# Patient Record
Sex: FEMALE | Race: BLACK OR AFRICAN AMERICAN | NOT HISPANIC OR LATINO | Employment: FULL TIME | URBAN - METROPOLITAN AREA
[De-identification: names, ages, dates, MRNs, and addresses within clinical notes are randomized per-mention and may not be internally consistent; named-entity substitution may affect disease eponyms.]

---

## 2023-02-24 LAB — URINE CULTURE: NORMAL

## 2023-03-03 LAB
CHLAMYDIA TRACH., AMPLIFIED: NEGATIVE
CLUE CELLS: ABNORMAL
N. GONORRHEA, AMPLIFIED: NEGATIVE
NUGENT SCORE: 1
TRICHOMONAS VAGINALIS: NEGATIVE
VAGINITIS-BV + YEAST INTERPRETATION: ABNORMAL
YEAST: PRESENT

## 2023-03-05 ENCOUNTER — HOSPITAL ENCOUNTER (OUTPATIENT)
Dept: DATA CONVERSION | Facility: HOSPITAL | Age: 23
End: 2023-03-05
Attending: OBSTETRICS & GYNECOLOGY
Payer: COMMERCIAL

## 2023-03-05 DIAGNOSIS — O36.8390: ICD-10-CM

## 2023-03-05 DIAGNOSIS — O26.892 OTHER SPECIFIED PREGNANCY RELATED CONDITIONS, SECOND TRIMESTER (HHS-HCC): ICD-10-CM

## 2023-03-05 DIAGNOSIS — R10.32 LEFT LOWER QUADRANT PAIN: ICD-10-CM

## 2023-03-05 DIAGNOSIS — Z3A.24 24 WEEKS GESTATION OF PREGNANCY (HHS-HCC): ICD-10-CM

## 2023-03-05 DIAGNOSIS — Z34.80 ENCOUNTER FOR SUPERVISION OF OTHER NORMAL PREGNANCY, UNSPECIFIED TRIMESTER (HHS-HCC): ICD-10-CM

## 2023-04-04 LAB
ERYTHROCYTE DISTRIBUTION WIDTH (RATIO) BY AUTOMATED COUNT: 12.6 % (ref 11.5–14.5)
ERYTHROCYTE MEAN CORPUSCULAR HEMOGLOBIN CONCENTRATION (G/DL) BY AUTOMATED: 33 G/DL (ref 32–36)
ERYTHROCYTE MEAN CORPUSCULAR VOLUME (FL) BY AUTOMATED COUNT: 91 FL (ref 80–100)
ERYTHROCYTES (10*6/UL) IN BLOOD BY AUTOMATED COUNT: 3.34 X10E12/L (ref 4–5.2)
FERRITIN, PREGNANCY: 22 UG/L
GLUCOSE, 1 HR SCREEN, PREG: 130 MG/DL
HEMATOCRIT (%) IN BLOOD BY AUTOMATED COUNT: 30.3 % (ref 36–46)
HEMOGLOBIN (G/DL) IN BLOOD: 10 G/DL (ref 12–16)
IRON (UG/DL) IN SER/PLAS IN PREGNANCY: 74 UG/DL
IRON BINDING CAPACITY (UG/DL) IN PREGNANCY: >524 UG/DL
IRON SATURATION (%) IN PREGNANCY: ABNORMAL %
LEUKOCYTES (10*3/UL) IN BLOOD BY AUTOMATED COUNT: 9.2 X10E9/L (ref 4.4–11.3)
NRBC (PER 100 WBCS) BY AUTOMATED COUNT: 0 /100 WBC (ref 0–0)
PLATELETS (10*3/UL) IN BLOOD AUTOMATED COUNT: 250 X10E9/L (ref 150–450)
REFLEX ADDED, ANEMIA PANEL: ABNORMAL
SYPHILIS TOTAL AB: NONREACTIVE

## 2023-04-05 LAB
FOLATE, SERUM, PREGNANCY: 20.1 NG/ML
VITAMIN B12, PREGNANCY: 244 PG/ML

## 2023-04-13 ENCOUNTER — HOSPITAL ENCOUNTER (OUTPATIENT)
Dept: DATA CONVERSION | Facility: HOSPITAL | Age: 23
End: 2023-04-13
Attending: OBSTETRICS & GYNECOLOGY
Payer: COMMERCIAL

## 2023-04-13 DIAGNOSIS — Z3A.30 30 WEEKS GESTATION OF PREGNANCY (HHS-HCC): ICD-10-CM

## 2023-04-13 DIAGNOSIS — O99.343 OTHER MENTAL DISORDERS COMPLICATING PREGNANCY, THIRD TRIMESTER (HHS-HCC): ICD-10-CM

## 2023-04-13 DIAGNOSIS — Z79.899 OTHER LONG TERM (CURRENT) DRUG THERAPY: ICD-10-CM

## 2023-04-13 DIAGNOSIS — F41.0 PANIC DISORDER (EPISODIC PAROXYSMAL ANXIETY): ICD-10-CM

## 2023-04-13 DIAGNOSIS — O99.613 DISEASES OF THE DIGESTIVE SYSTEM COMPLICATING PREGNANCY, THIRD TRIMESTER (HHS-HCC): ICD-10-CM

## 2023-04-13 DIAGNOSIS — O26.893 OTHER SPECIFIED PREGNANCY RELATED CONDITIONS, THIRD TRIMESTER (HHS-HCC): ICD-10-CM

## 2023-04-13 DIAGNOSIS — R42 DIZZINESS AND GIDDINESS: ICD-10-CM

## 2023-04-13 DIAGNOSIS — R55 SYNCOPE AND COLLAPSE: ICD-10-CM

## 2023-04-13 DIAGNOSIS — D57.3 SICKLE-CELL TRAIT (CMS-HCC): ICD-10-CM

## 2023-04-13 DIAGNOSIS — K59.00 CONSTIPATION, UNSPECIFIED: ICD-10-CM

## 2023-04-13 DIAGNOSIS — R10.2 PELVIC AND PERINEAL PAIN: ICD-10-CM

## 2023-04-13 DIAGNOSIS — O99.013 ANEMIA COMPLICATING PREGNANCY, THIRD TRIMESTER (HHS-HCC): ICD-10-CM

## 2023-04-13 LAB
ALANINE AMINOTRANSFERASE (SGPT) (U/L) IN SER/PLAS: 6 U/L (ref 7–45)
ALBUMIN (G/DL) IN SER/PLAS: 3.6 G/DL (ref 3.4–5)
ALKALINE PHOSPHATASE (U/L) IN SER/PLAS: 108 U/L (ref 33–110)
ANION GAP IN SER/PLAS: 13 MMOL/L (ref 10–20)
ASPARTATE AMINOTRANSFERASE (SGOT) (U/L) IN SER/PLAS: 11 U/L (ref 9–39)
BILIRUBIN TOTAL (MG/DL) IN SER/PLAS: 0.3 MG/DL (ref 0–1.2)
CALCIUM (MG/DL) IN SER/PLAS: 8.7 MG/DL (ref 8.6–10.6)
CARBON DIOXIDE, TOTAL (MMOL/L) IN SER/PLAS: 22 MMOL/L (ref 21–32)
CHLORIDE (MMOL/L) IN SER/PLAS: 106 MMOL/L (ref 98–107)
CREATININE (MG/DL) IN SER/PLAS: 0.44 MG/DL (ref 0.5–1.05)
ERYTHROCYTE DISTRIBUTION WIDTH (RATIO) BY AUTOMATED COUNT: 12.6 % (ref 11.5–14.5)
ERYTHROCYTE MEAN CORPUSCULAR HEMOGLOBIN CONCENTRATION (G/DL) BY AUTOMATED: 34.6 G/DL (ref 32–36)
ERYTHROCYTE MEAN CORPUSCULAR VOLUME (FL) BY AUTOMATED COUNT: 89 FL (ref 80–100)
ERYTHROCYTES (10*6/UL) IN BLOOD BY AUTOMATED COUNT: 3.14 X10E12/L (ref 4–5.2)
GFR FEMALE: >90 ML/MIN/1.73M2
GLUCOSE (MG/DL) IN SER/PLAS: 96 MG/DL (ref 74–99)
HEMATOCRIT (%) IN BLOOD BY AUTOMATED COUNT: 28 % (ref 36–46)
HEMOGLOBIN (G/DL) IN BLOOD: 9.7 G/DL (ref 12–16)
LEUKOCYTES (10*3/UL) IN BLOOD BY AUTOMATED COUNT: 11.3 X10E9/L (ref 4.4–11.3)
NRBC (PER 100 WBCS) BY AUTOMATED COUNT: 0 /100 WBC (ref 0–0)
PLATELETS (10*3/UL) IN BLOOD AUTOMATED COUNT: 226 X10E9/L (ref 150–450)
POTASSIUM (MMOL/L) IN SER/PLAS: 3.7 MMOL/L (ref 3.5–5.3)
PROTEIN TOTAL: 6.3 G/DL (ref 6.4–8.2)
SODIUM (MMOL/L) IN SER/PLAS: 137 MMOL/L (ref 136–145)
UREA NITROGEN (MG/DL) IN SER/PLAS: 5 MG/DL (ref 6–23)

## 2023-05-11 ENCOUNTER — APPOINTMENT (OUTPATIENT)
Dept: LAB | Facility: LAB | Age: 23
End: 2023-05-11
Payer: COMMERCIAL

## 2023-05-12 LAB — URINE CULTURE: NORMAL

## 2023-05-15 ENCOUNTER — HOSPITAL ENCOUNTER (OUTPATIENT)
Dept: DATA CONVERSION | Facility: HOSPITAL | Age: 23
End: 2023-05-16
Attending: ADVANCED PRACTICE MIDWIFE
Payer: COMMERCIAL

## 2023-05-15 DIAGNOSIS — R10.9 UNSPECIFIED ABDOMINAL PAIN: ICD-10-CM

## 2023-05-15 DIAGNOSIS — D57.3 SICKLE-CELL TRAIT (CMS-HCC): ICD-10-CM

## 2023-05-15 DIAGNOSIS — Z3A.34 34 WEEKS GESTATION OF PREGNANCY (HHS-HCC): ICD-10-CM

## 2023-05-15 DIAGNOSIS — O26.893 OTHER SPECIFIED PREGNANCY RELATED CONDITIONS, THIRD TRIMESTER (HHS-HCC): ICD-10-CM

## 2023-05-15 DIAGNOSIS — D64.9 ANEMIA, UNSPECIFIED: ICD-10-CM

## 2023-05-15 DIAGNOSIS — O34.219 MATERNAL CARE FOR UNSPECIFIED TYPE SCAR FROM PREVIOUS CESAREAN DELIVERY (HHS-HCC): ICD-10-CM

## 2023-05-15 DIAGNOSIS — Z79.899 OTHER LONG TERM (CURRENT) DRUG THERAPY: ICD-10-CM

## 2023-05-15 DIAGNOSIS — O99.013 ANEMIA COMPLICATING PREGNANCY, THIRD TRIMESTER (HHS-HCC): ICD-10-CM

## 2023-05-18 LAB — URINE CULTURE: ABNORMAL

## 2023-05-24 ENCOUNTER — HOSPITAL ENCOUNTER (OUTPATIENT)
Dept: DATA CONVERSION | Facility: HOSPITAL | Age: 23
End: 2023-05-24
Attending: OBSTETRICS & GYNECOLOGY
Payer: COMMERCIAL

## 2023-05-24 DIAGNOSIS — O47.1 FALSE LABOR AT OR AFTER 37 COMPLETED WEEKS OF GESTATION (HHS-HCC): ICD-10-CM

## 2023-05-24 DIAGNOSIS — Z3A.36 36 WEEKS GESTATION OF PREGNANCY (HHS-HCC): ICD-10-CM

## 2023-05-31 LAB — URINE CULTURE: ABNORMAL

## 2023-06-06 ENCOUNTER — APPOINTMENT (OUTPATIENT)
Dept: LAB | Facility: LAB | Age: 23
End: 2023-06-06
Payer: COMMERCIAL

## 2023-06-06 LAB
ERYTHROCYTE DISTRIBUTION WIDTH (RATIO) BY AUTOMATED COUNT: 12.8 % (ref 11.5–14.5)
ERYTHROCYTE MEAN CORPUSCULAR HEMOGLOBIN CONCENTRATION (G/DL) BY AUTOMATED: 33.6 G/DL (ref 32–36)
ERYTHROCYTE MEAN CORPUSCULAR VOLUME (FL) BY AUTOMATED COUNT: 90 FL (ref 80–100)
ERYTHROCYTES (10*6/UL) IN BLOOD BY AUTOMATED COUNT: 3.28 X10E12/L (ref 4–5.2)
FERRITIN (UG/LL) IN SER/PLAS: 18 UG/L (ref 8–150)
HEMATOCRIT (%) IN BLOOD BY AUTOMATED COUNT: 29.5 % (ref 36–46)
HEMOGLOBIN (G/DL) IN BLOOD: 9.9 G/DL (ref 12–16)
HEMOGLOBIN (PG) IN RETICULOCYTES: 34 PG (ref 28–38)
IMMATURE RETIC FRACTION: 25.9 % (ref 0–16)
LEUKOCYTES (10*3/UL) IN BLOOD BY AUTOMATED COUNT: 11.6 X10E9/L (ref 4.4–11.3)
NRBC (PER 100 WBCS) BY AUTOMATED COUNT: 0 /100 WBC (ref 0–0)
PLATELETS (10*3/UL) IN BLOOD AUTOMATED COUNT: 203 X10E9/L (ref 150–450)
RETICULOCYTES (10*3/UL) IN BLOOD: 0.07 X10E12/L (ref 0.02–0.08)
RETICULOCYTES/100 ERYTHROCYTES IN BLOOD BY AUTOMATED COUNT: 2.2 % (ref 0.5–2)

## 2023-06-07 LAB — URINE CULTURE: NORMAL

## 2023-06-13 ENCOUNTER — APPOINTMENT (OUTPATIENT)
Dept: LAB | Facility: LAB | Age: 23
End: 2023-06-13
Payer: COMMERCIAL

## 2023-06-13 LAB
ABO GROUP (TYPE) IN BLOOD: NORMAL
ANTIBODY SCREEN: NORMAL
ERYTHROCYTE DISTRIBUTION WIDTH (RATIO) BY AUTOMATED COUNT: 13.1 % (ref 11.5–14.5)
ERYTHROCYTE MEAN CORPUSCULAR HEMOGLOBIN CONCENTRATION (G/DL) BY AUTOMATED: 33.2 G/DL (ref 32–36)
ERYTHROCYTE MEAN CORPUSCULAR VOLUME (FL) BY AUTOMATED COUNT: 91 FL (ref 80–100)
ERYTHROCYTES (10*6/UL) IN BLOOD BY AUTOMATED COUNT: 3.54 X10E12/L (ref 4–5.2)
HEMATOCRIT (%) IN BLOOD BY AUTOMATED COUNT: 32.2 % (ref 36–46)
HEMOGLOBIN (G/DL) IN BLOOD: 10.7 G/DL (ref 12–16)
LEUKOCYTES (10*3/UL) IN BLOOD BY AUTOMATED COUNT: 10.5 X10E9/L (ref 4.4–11.3)
NRBC (PER 100 WBCS) BY AUTOMATED COUNT: 0 /100 WBC (ref 0–0)
PLATELETS (10*3/UL) IN BLOOD AUTOMATED COUNT: 215 X10E9/L (ref 150–450)
RH FACTOR: NORMAL

## 2023-06-15 LAB — URINE CULTURE: NORMAL

## 2023-09-07 VITALS
OXYGEN SATURATION: 100 % | WEIGHT: 145.5 LBS | HEIGHT: 63 IN | HEART RATE: 83 BPM | RESPIRATION RATE: 18 BRPM | SYSTOLIC BLOOD PRESSURE: 112 MMHG | DIASTOLIC BLOOD PRESSURE: 74 MMHG | TEMPERATURE: 98.1 F | BODY MASS INDEX: 25.78 KG/M2

## 2023-09-07 VITALS
TEMPERATURE: 98.6 F | WEIGHT: 153.22 LBS | SYSTOLIC BLOOD PRESSURE: 100 MMHG | DIASTOLIC BLOOD PRESSURE: 68 MMHG | HEART RATE: 100 BPM | OXYGEN SATURATION: 100 % | HEIGHT: 64 IN | BODY MASS INDEX: 26.16 KG/M2 | RESPIRATION RATE: 18 BRPM

## 2023-09-07 VITALS — HEIGHT: 64 IN | BODY MASS INDEX: 27.48 KG/M2 | WEIGHT: 160.94 LBS

## 2023-09-07 VITALS — BODY MASS INDEX: 27.51 KG/M2 | WEIGHT: 161.16 LBS | HEIGHT: 64 IN

## 2023-09-14 NOTE — PROGRESS NOTES
Current Stage:   Stage: Triage     Subjective Data:   Antepartum:  Antepartum:    21yo  at 24.4 presents to l&d reporting sudden onset of LLQ pain earlier today. she reports that it came on suddenly and has been on & off all day with some  soreness in the area between exacerbations. she has not tried any alleviating measures. no bleeding, leaking or contractions. baby is active. no N/V/D      Objective Information:    Objective Information:      T   P  R  BP   MAP  SpO2   Value  36.6  82  18  92/63   72  100%  Date/Time 3/5 17:44 3/5 18:43 3/5 17:44 3/5 17:44  3/5 17:44 3/5 18:43  Range  (36.6C - 36.7C )  (73 - 92 )  (18 - 18 )  (92 - 112 )/ (63 - 74 )  (72 - 72 )  (99% - 100% )      Pain reported at 3/5 18:45: 0 = None      Physical Exam:   Obstetric: FH shows audible arrhythmia and baseline  cannot be determined  toco:  none    abdomen: soft, nontender  no masses, tenderness in inguinal area  LLQ tender to palpation     Assessment and Plan:   Assessment:    21yo  at 24.4  likely round ligament pain  fetal cardiac arrhythmia    etiology of and comfort measures for RLP reviewed  dr. edmond to evaluate arrhythmia    rginny jung      Electronic Signatures:  Dianna Rice (YAW-GINNY)  (Signed 05-Mar-2023 19:23)   Authored: Current Stage, Subjective Data, Objective Data,  Assessment and Plan, Note Completion      Last Updated: 05-Mar-2023 19:23 by Dianna Rice (YAW-GINNY)
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
## 2023-09-30 NOTE — PROGRESS NOTES
Current Stage:   Stage: Triage     OB Dating:   EDC/EGA:  ·  EGA 36     Subjective Data:   Antepartum:  Vaginal Bleeding: No   Contractions/Abdominal Pain: Yes   Discharge/Loss of Fluid: No   Fetal Movement: Good   Fevers/Chills: No   Preeclampsia Symptoms: No   Antepartum:    23 y/o  @ 36 wks presents for ctx. States they were approx every five min earlier but not sure how frequent now, denies lof and vaginal bleeding.  Reports  normal fetal movement.      Objective Information:    Objective Information:      T   P  R  BP   MAP  SpO2   Value  36.6  76  18  105/63   79  100%  Date/Time  5:40  5:40  5:40  5:40   5:40  5:40  Range  (36.6C - 36.6C )  (76 - 76 )  (18 - 18 )  (105 - 105 )/ (63 - 63 )  (79 - 79 )  (100% - 100% )      Pain reported at  5:38: 0 = None      Physical Exam:   Constitutional: alert, oriented, appears comfortable   Obstetric: , mod variability, 2 accels, no  decels  [place] q2-5 min, mild to palpation  SVE ft/50/-3     Assessment and Plan:   Assessment:    a: 23 y/o  @ 36 wks  false labor  reactive nst    p: discharge home w/precautions  f/u with scheduled appt today or as needed    riri friedman cnm      Electronic Signatures:  Riri Friedman (APRN-AGAPITOM)  (Signed 24-May-2023 06:21)   Authored: Current Stage, OB Dating, Subjective Data,  Objective Data, Assessment and Plan, Note Completion      Last Updated: 24-May-2023 06:21 by Riri Friedman (APRN-CNM)

## 2023-09-30 NOTE — PROGRESS NOTES
Current Stage:   Stage: Triage     Subjective Data:   Antepartum:  Antepartum:    21 yo [id number] at 34.5 wga by 7 wk US who presents due to abdominal pressure. Reports pressure at previous  site that started around 1:30 pm. States pressure is intermittent,  however unsure if contractions. denies any dysuria, nausea, vomiting, LOF or vaginal bleeding. Good fetal movement.    Pregnancy notable for:  - Fetal PAC's noted at triage, fetal echo WNL  - Anemia, on PO iron, last Hgb 9.7 on     Obhx:   -  - C/s for NFHT after epidural placement  Gynhx: Previous C/s  Medhx: Sickle cell trait  Meds: PNV, zofran, miralax  Surg: None  All: amoxicillin, penicillin  Soc: no other drug or alcohol use           Objective Information:    Objective Information:      T   P  R  BP   MAP  SpO2   Value  36.8  90  16  110/65   81  99%  Date/Time 5/15 21:36 5/15 21:36 5/15 21:36 5/15 21:36  5/15 21:36 5/15 21:36  Range  (36.8C - 36.8C )  (90 - 90 )  (16 - 16 )  (110 - 110 )/ (65 - 65 )  (81 - 81 )  (99% - 99% )      Pain reported at 5/15 21:36: 5 = Moderate      Physical Exam:   Constitutional: alert, oriented, in bed   Obstetric: FHT: baseline 135, mod variability, +  accels, - decels  [place]: ctx q3-5 min  SVE: 0.5/50/-3, same on 2 hr recheck   Eyes: non-icteric, full range of ocular movement  intact   Head/Neck: NC/AT   Respiratory/Thorax: normal respiratory effort on  RA   Cardiovascular: warm and well-perfused   Gastrointestinal: gravid uterus   Musculoskeletal: Moving all four extremities   Neurological: Grossly intact   Psychological: appropriate affect and mood.   Skin: Warm and dry      Testing:   NST Interpretation - Baby A:  ·  Baseline    ·  Variability moderate (amplitude range 6 to 25 bpm)   ·  Interpretation Reactive (2 15x15 accels)   ·  Accelerations +   ·  Decelerations -     Assessment and Plan:   Assessment:    21 yo [id number] at 34.5 wga by 7 wk US who presents due to abdominal  pressure.    Abdominal pressure  - SVE 0.5/50/-3, unchanged after 2 hr recheck  - Abdominal pain mildly improved s/p Tylenol  - Difficult to completely characterize YOLANDA however no overt evidence of uterine rupture on US, pain improved with Tylenol and FHT reassuring, therefore low concern at this time  - recommend f/u US at next prenatal visit  - precautions provided  - recommended tylenol, warm showers, hot packs for discomfort    Maternal wellbeing  - no acute distress  - vitals stable and WNL    IUP  - reactive NST  - good fetal movement    Dispo: Stable for d/c to home w/ precautions, next prenatal visit on , will schedule for growth US    Discussed with Dr. Diane Stewart MD PGY-1      Attestation:   Note Completion:  I am a:  Resident/Fellow   Attending Attestation I saw and evaluated the patient.  I personally obtained the key and critical portions of the history and physical exam or was physically present for key and  critical portions performed by the resident/fellow. I reviewed the resident/fellow?s documentation and discussed the patient with the resident/fellow.  I agree with the resident/fellow?s medical decision making as documented in the note.     I personally evaluated the patient on 15-May-2023         Electronic Signatures:  Dasha Stewart (Resident))  (Signed 16-May-2023 01:48)   Authored: Current Stage, Subjective Data, Objective Data,   Testing, Assessment and Plan, Note Completion  Verito Contreras)  (Signed 19-May-2023 10:32)   Authored: Assessment and Plan, Note Completion   Co-Signer: Current Stage, Subjective Data, Objective Data,  Testing, Assessment and Plan, Note Completion      Last Updated: 19-May-2023 10:32 by Verito Contreras)

## 2023-10-14 PROBLEM — O34.219 UTERINE SCAR FROM PREVIOUS CESAREAN DELIVERY AFFECTING PREGNANCY (HHS-HCC): Status: ACTIVE | Noted: 2023-10-14

## 2023-10-14 PROBLEM — O36.8390 FETAL ARRHYTHMIA AFFECTING PREGNANCY, ANTEPARTUM (HHS-HCC): Status: ACTIVE | Noted: 2023-10-14

## 2023-10-14 PROBLEM — O99.611 CONSTIPATION DURING PREGNANCY IN FIRST TRIMESTER (HHS-HCC): Status: ACTIVE | Noted: 2023-10-14

## 2023-10-14 PROBLEM — O03.9 MISCARRIAGE (HHS-HCC): Status: ACTIVE | Noted: 2023-10-14

## 2023-10-14 PROBLEM — O35.BXX0 ABNORMAL FETAL ECHOCARDIOGRAM AFFECTING ANTEPARTUM CARE OF MOTHER (HHS-HCC): Status: ACTIVE | Noted: 2023-10-14

## 2023-10-14 PROBLEM — R31.9 HEMATURIA: Status: ACTIVE | Noted: 2023-10-14

## 2023-10-14 PROBLEM — O99.011 ANEMIA DURING PREGNANCY IN FIRST TRIMESTER (HHS-HCC): Status: ACTIVE | Noted: 2023-10-14

## 2023-10-14 PROBLEM — Z34.80 PRENATAL CARE, SUBSEQUENT PREGNANCY (HHS-HCC): Status: ACTIVE | Noted: 2023-10-14

## 2023-10-14 PROBLEM — K59.00 CONSTIPATION DURING PREGNANCY IN FIRST TRIMESTER (HHS-HCC): Status: ACTIVE | Noted: 2023-10-14

## 2023-10-14 PROBLEM — O46.90 VAGINAL BLEEDING IN PREGNANCY (HHS-HCC): Status: ACTIVE | Noted: 2023-10-14

## 2023-10-14 PROBLEM — D64.9 NORMOCYTIC ANEMIA: Status: ACTIVE | Noted: 2023-10-14

## 2023-10-14 PROBLEM — O09.41 HIGH RISK MULTIGRAVIDA IN FIRST TRIMESTER (HHS-HCC): Status: ACTIVE | Noted: 2023-10-14

## 2023-10-14 PROBLEM — A74.9 CHLAMYDIAL INFECTION: Status: ACTIVE | Noted: 2017-05-30

## 2023-10-14 PROBLEM — Z3A.10 10 WEEKS GESTATION OF PREGNANCY (HHS-HCC): Status: ACTIVE | Noted: 2023-10-14

## 2023-10-14 PROBLEM — A54.9 GONORRHEA: Status: ACTIVE | Noted: 2017-05-30

## 2023-10-14 PROBLEM — O36.8390: Status: ACTIVE | Noted: 2023-10-14

## 2023-10-14 PROBLEM — M62.08 DIASTASIS RECTI: Status: ACTIVE | Noted: 2023-10-14

## 2023-10-14 RX ORDER — PNV,CALCIUM 72/IRON/FOLIC ACID 27 MG-1 MG
1 TABLET ORAL DAILY
COMMUNITY
Start: 2022-11-07

## 2023-10-14 RX ORDER — PNV NO.95/FERROUS FUM/FOLIC AC 28MG-0.8MG
TABLET ORAL
COMMUNITY
Start: 2019-10-29

## 2023-10-14 RX ORDER — DOXYLAMINE SUCCINATE 25 MG/1
25 TABLET ORAL NIGHTLY
COMMUNITY
Start: 2022-11-07

## 2023-10-14 RX ORDER — DOCUSATE SODIUM 100 MG/1
CAPSULE, LIQUID FILLED ORAL
COMMUNITY
Start: 2023-06-05

## 2023-10-14 RX ORDER — FERROUS GLUCONATE 324(37.5)
38 TABLET ORAL DAILY
COMMUNITY
Start: 2023-06-02

## 2023-10-14 RX ORDER — LANOLIN ALCOHOL/MO/W.PET/CERES
0.5 CREAM (GRAM) TOPICAL NIGHTLY
COMMUNITY
Start: 2022-11-07

## 2023-10-14 RX ORDER — FERROUS SULFATE 325(65) MG
1 TABLET ORAL DAILY
COMMUNITY

## 2023-10-14 RX ORDER — MV-MN 110/FA/OM3/DHA/EPA/FISH 180-35-25
TABLET,CHEWABLE ORAL
COMMUNITY
Start: 2023-06-02

## 2023-10-14 RX ORDER — DIPHENHYDRAMINE HCL 25 MG/1
TABLET ORAL
COMMUNITY
Start: 2022-12-27

## 2023-10-16 ENCOUNTER — ROUTINE PRENATAL (OUTPATIENT)
Dept: OBSTETRICS AND GYNECOLOGY | Facility: HOSPITAL | Age: 23
End: 2023-10-16

## 2023-10-16 DIAGNOSIS — Z30.42 ENCOUNTER FOR SURVEILLANCE OF INJECTABLE CONTRACEPTIVE: ICD-10-CM

## 2023-10-16 PROCEDURE — 0501F PRENATAL FLOW SHEET: CPT

## 2023-10-16 PROCEDURE — 99213 OFFICE O/P EST LOW 20 MIN: CPT

## 2023-10-16 RX ORDER — SERTRALINE HYDROCHLORIDE 25 MG/1
25 TABLET, FILM COATED ORAL DAILY
Qty: 30 TABLET | Refills: 11 | Status: SHIPPED | OUTPATIENT
Start: 2023-10-16 | End: 2024-10-15

## 2023-10-16 RX ORDER — MEDROXYPROGESTERONE ACETATE 150 MG/ML
150 INJECTION, SUSPENSION INTRAMUSCULAR ONCE
Status: CANCELLED | OUTPATIENT
Start: 2023-10-16 | End: 2023-10-16

## 2023-10-16 NOTE — PROGRESS NOTES
"Patient in office for a Depo injection for birth control.  She delivered infant in June and has had postpartum depression symptoms since then.  She has days where \"she cries a lot\" and \"just feels really sad.\"  Denies SI or HI at the time of the appointment.  Endorses a history of anxiety in which she sometimes has panic attacks and depression after delivery but she \"has never been this bad.\"  She has never taken medication for either.  Upon further evaluation, and discussion, she is agreeable to beginning Zoloft 25mg daily and to follow up with a PCP for long-term management.  Described side effects to patient, patient verbalized understanding. Patient verbalized understanding and is grateful for the assistance.    Radha Eid, YAW-ALLY    "

## 2023-10-17 RX ORDER — MEDROXYPROGESTERONE ACETATE 150 MG/ML
150 INJECTION, SUSPENSION INTRAMUSCULAR ONCE
Status: SHIPPED | OUTPATIENT
Start: 2023-10-17

## 2023-10-23 ENCOUNTER — APPOINTMENT (OUTPATIENT)
Dept: PRIMARY CARE | Facility: HOSPITAL | Age: 23
End: 2023-10-23
Payer: COMMERCIAL

## 2023-11-03 ENCOUNTER — APPOINTMENT (OUTPATIENT)
Dept: PRIMARY CARE | Facility: CLINIC | Age: 23
End: 2023-11-03
Payer: COMMERCIAL

## 2023-12-14 ENCOUNTER — APPOINTMENT (OUTPATIENT)
Dept: PRIMARY CARE | Facility: HOSPITAL | Age: 23
End: 2023-12-14
Payer: COMMERCIAL

## 2023-12-14 RX ORDER — ACETAMINOPHEN 325 MG/1
325 TABLET ORAL EVERY 6 HOURS PRN
COMMUNITY
Start: 2023-06-18

## 2023-12-14 RX ORDER — IBUPROFEN 600 MG/1
600 TABLET ORAL EVERY 6 HOURS
COMMUNITY
Start: 2023-06-18

## 2024-01-02 ENCOUNTER — PROCEDURE VISIT (OUTPATIENT)
Dept: OBSTETRICS AND GYNECOLOGY | Facility: HOSPITAL | Age: 24
End: 2024-01-02
Payer: COMMERCIAL

## 2024-01-02 DIAGNOSIS — Z30.42 ENCOUNTER FOR SURVEILLANCE OF INJECTABLE CONTRACEPTIVE: Primary | ICD-10-CM

## 2024-01-02 PROCEDURE — 2500000004 HC RX 250 GENERAL PHARMACY W/ HCPCS (ALT 636 FOR OP/ED): Performed by: OBSTETRICS & GYNECOLOGY

## 2024-01-02 PROCEDURE — 96372 THER/PROPH/DIAG INJ SC/IM: CPT | Performed by: OBSTETRICS & GYNECOLOGY

## 2024-01-02 RX ORDER — MEDROXYPROGESTERONE ACETATE 150 MG/ML
150 INJECTION, SUSPENSION INTRAMUSCULAR ONCE
Status: COMPLETED | OUTPATIENT
Start: 2024-01-02 | End: 2024-01-02

## 2024-01-02 RX ADMIN — MEDROXYPROGESTERONE ACETATE 150 MG: 150 INJECTION, SUSPENSION INTRAMUSCULAR at 14:00

## 2024-01-02 NOTE — PROGRESS NOTES
Patient identified by name and date of birth   Patient received her last injection on 10/16/23  Patient is due for her next injection between 3/20/24-4/-3-24  Patient denies any concerns with injection   Patient educated on the importance of compliance. Patient given injection in left deltoid patient tolerated well.Patient encouraged to schedule return visit prior to leaving the office.Patient encouraged to call office if she has any future questions or concerns.Patient verbalized understanding

## 2024-01-24 ENCOUNTER — CLINICAL SUPPORT (OUTPATIENT)
Dept: EMERGENCY MEDICINE | Facility: HOSPITAL | Age: 24
End: 2024-01-24
Payer: COMMERCIAL

## 2024-01-24 ENCOUNTER — HOSPITAL ENCOUNTER (EMERGENCY)
Facility: HOSPITAL | Age: 24
Discharge: HOME | End: 2024-01-25
Payer: COMMERCIAL

## 2024-01-24 VITALS
DIASTOLIC BLOOD PRESSURE: 85 MMHG | WEIGHT: 153 LBS | BODY MASS INDEX: 27.11 KG/M2 | HEIGHT: 63 IN | HEART RATE: 85 BPM | TEMPERATURE: 97.2 F | SYSTOLIC BLOOD PRESSURE: 122 MMHG

## 2024-01-24 DIAGNOSIS — R51.9 ACUTE NONINTRACTABLE HEADACHE, UNSPECIFIED HEADACHE TYPE: Primary | ICD-10-CM

## 2024-01-24 LAB
ALBUMIN SERPL BCP-MCNC: 4.8 G/DL (ref 3.4–5)
ALP SERPL-CCNC: 73 U/L (ref 33–110)
ALT SERPL W P-5'-P-CCNC: 8 U/L (ref 7–45)
AMPHETAMINES UR QL SCN: NORMAL
ANION GAP SERPL CALC-SCNC: 13 MMOL/L (ref 10–20)
APAP SERPL-MCNC: <10 UG/ML
AST SERPL W P-5'-P-CCNC: 11 U/L (ref 9–39)
ATRIAL RATE: 75 BPM
BARBITURATES UR QL SCN: NORMAL
BASOPHILS # BLD AUTO: 0.05 X10*3/UL (ref 0–0.1)
BASOPHILS NFR BLD AUTO: 0.6 %
BENZODIAZ UR QL SCN: NORMAL
BILIRUB SERPL-MCNC: 0.4 MG/DL (ref 0–1.2)
BUN SERPL-MCNC: 10 MG/DL (ref 6–23)
BZE UR QL SCN: NORMAL
CALCIUM SERPL-MCNC: 9.6 MG/DL (ref 8.6–10.6)
CANNABINOIDS UR QL SCN: NORMAL
CHLORIDE SERPL-SCNC: 106 MMOL/L (ref 98–107)
CO2 SERPL-SCNC: 24 MMOL/L (ref 21–32)
CREAT SERPL-MCNC: 0.83 MG/DL (ref 0.5–1.05)
EGFRCR SERPLBLD CKD-EPI 2021: >90 ML/MIN/1.73M*2
EOSINOPHIL # BLD AUTO: 0.11 X10*3/UL (ref 0–0.7)
EOSINOPHIL NFR BLD AUTO: 1.4 %
ERYTHROCYTE [DISTWIDTH] IN BLOOD BY AUTOMATED COUNT: 12.3 % (ref 11.5–14.5)
ETHANOL SERPL-MCNC: <10 MG/DL
FENTANYL+NORFENTANYL UR QL SCN: NORMAL
GLUCOSE SERPL-MCNC: 72 MG/DL (ref 74–99)
HCT VFR BLD AUTO: 36.1 % (ref 36–46)
HGB BLD-MCNC: 12.6 G/DL (ref 12–16)
IMM GRANULOCYTES # BLD AUTO: 0.02 X10*3/UL (ref 0–0.7)
IMM GRANULOCYTES NFR BLD AUTO: 0.3 % (ref 0–0.9)
LYMPHOCYTES # BLD AUTO: 3.29 X10*3/UL (ref 1.2–4.8)
LYMPHOCYTES NFR BLD AUTO: 42 %
MCH RBC QN AUTO: 28.3 PG (ref 26–34)
MCHC RBC AUTO-ENTMCNC: 34.9 G/DL (ref 32–36)
MCV RBC AUTO: 81 FL (ref 80–100)
MONOCYTES # BLD AUTO: 0.57 X10*3/UL (ref 0.1–1)
MONOCYTES NFR BLD AUTO: 7.3 %
NEUTROPHILS # BLD AUTO: 3.8 X10*3/UL (ref 1.2–7.7)
NEUTROPHILS NFR BLD AUTO: 48.4 %
NRBC BLD-RTO: 0 /100 WBCS (ref 0–0)
OPIATES UR QL SCN: NORMAL
OXYCODONE+OXYMORPHONE UR QL SCN: NORMAL
P AXIS: 74 DEGREES
P OFFSET: 200 MS
P ONSET: 148 MS
PCP UR QL SCN: NORMAL
PLATELET # BLD AUTO: 300 X10*3/UL (ref 150–450)
POTASSIUM SERPL-SCNC: 3.8 MMOL/L (ref 3.5–5.3)
PR INTERVAL: 156 MS
PREGNANCY TEST URINE, POC: NEGATIVE
PROT SERPL-MCNC: 8 G/DL (ref 6.4–8.2)
Q ONSET: 226 MS
QRS COUNT: 12 BEATS
QRS DURATION: 72 MS
QT INTERVAL: 356 MS
QTC CALCULATION(BAZETT): 397 MS
QTC FREDERICIA: 383 MS
R AXIS: 70 DEGREES
RBC # BLD AUTO: 4.46 X10*6/UL (ref 4–5.2)
SALICYLATES SERPL-MCNC: <3 MG/DL
SODIUM SERPL-SCNC: 139 MMOL/L (ref 136–145)
T AXIS: 50 DEGREES
T OFFSET: 404 MS
VENTRICULAR RATE: 75 BPM
WBC # BLD AUTO: 7.8 X10*3/UL (ref 4.4–11.3)

## 2024-01-24 PROCEDURE — 36415 COLL VENOUS BLD VENIPUNCTURE: CPT | Performed by: EMERGENCY MEDICINE

## 2024-01-24 PROCEDURE — 99284 EMERGENCY DEPT VISIT MOD MDM: CPT | Mod: 25,27

## 2024-01-24 PROCEDURE — 81025 URINE PREGNANCY TEST: CPT | Performed by: NURSE PRACTITIONER

## 2024-01-24 PROCEDURE — 99284 EMERGENCY DEPT VISIT MOD MDM: CPT | Performed by: NURSE PRACTITIONER

## 2024-01-24 PROCEDURE — 80053 COMPREHEN METABOLIC PANEL: CPT | Performed by: EMERGENCY MEDICINE

## 2024-01-24 PROCEDURE — 85025 COMPLETE CBC W/AUTO DIFF WBC: CPT | Performed by: EMERGENCY MEDICINE

## 2024-01-24 PROCEDURE — 93010 ELECTROCARDIOGRAM REPORT: CPT | Performed by: NURSE PRACTITIONER

## 2024-01-24 PROCEDURE — 2500000004 HC RX 250 GENERAL PHARMACY W/ HCPCS (ALT 636 FOR OP/ED): Mod: SE

## 2024-01-24 PROCEDURE — 96374 THER/PROPH/DIAG INJ IV PUSH: CPT

## 2024-01-24 PROCEDURE — 80307 DRUG TEST PRSMV CHEM ANLYZR: CPT | Performed by: EMERGENCY MEDICINE

## 2024-01-24 PROCEDURE — 81001 URINALYSIS AUTO W/SCOPE: CPT | Mod: 91 | Performed by: NURSE PRACTITIONER

## 2024-01-24 PROCEDURE — 80143 DRUG ASSAY ACETAMINOPHEN: CPT | Performed by: EMERGENCY MEDICINE

## 2024-01-24 PROCEDURE — 93005 ELECTROCARDIOGRAM TRACING: CPT

## 2024-01-24 RX ORDER — KETOROLAC TROMETHAMINE 15 MG/ML
15 INJECTION, SOLUTION INTRAMUSCULAR; INTRAVENOUS ONCE
Status: DISCONTINUED | OUTPATIENT
Start: 2024-01-24 | End: 2024-01-24

## 2024-01-24 RX ORDER — KETOROLAC TROMETHAMINE 15 MG/ML
15 INJECTION, SOLUTION INTRAMUSCULAR; INTRAVENOUS ONCE
Status: COMPLETED | OUTPATIENT
Start: 2024-01-24 | End: 2024-01-24

## 2024-01-24 RX ORDER — KETOROLAC TROMETHAMINE 15 MG/ML
INJECTION, SOLUTION INTRAMUSCULAR; INTRAVENOUS
Status: COMPLETED
Start: 2024-01-24 | End: 2024-01-24

## 2024-01-24 RX ADMIN — KETOROLAC TROMETHAMINE 15 MG: 15 INJECTION, SOLUTION INTRAMUSCULAR; INTRAVENOUS at 23:35

## 2024-01-24 ASSESSMENT — COLUMBIA-SUICIDE SEVERITY RATING SCALE - C-SSRS
2. HAVE YOU ACTUALLY HAD ANY THOUGHTS OF KILLING YOURSELF?: YES
1. IN THE PAST MONTH, HAVE YOU WISHED YOU WERE DEAD OR WISHED YOU COULD GO TO SLEEP AND NOT WAKE UP?: YES
6. HAVE YOU EVER DONE ANYTHING, STARTED TO DO ANYTHING, OR PREPARED TO DO ANYTHING TO END YOUR LIFE?: NO
4. HAVE YOU HAD THESE THOUGHTS AND HAD SOME INTENTION OF ACTING ON THEM?: NO
5. HAVE YOU STARTED TO WORK OUT OR WORKED OUT THE DETAILS OF HOW TO KILL YOURSELF? DO YOU INTEND TO CARRY OUT THIS PLAN?: NO

## 2024-01-24 ASSESSMENT — PAIN DESCRIPTION - ORIENTATION: ORIENTATION: RIGHT

## 2024-01-24 ASSESSMENT — LIFESTYLE VARIABLES
EVER FELT BAD OR GUILTY ABOUT YOUR DRINKING: NO
REASON UNABLE TO ASSESS: NO
EVER HAD A DRINK FIRST THING IN THE MORNING TO STEADY YOUR NERVES TO GET RID OF A HANGOVER: NO
HAVE PEOPLE ANNOYED YOU BY CRITICIZING YOUR DRINKING: NO
HAVE YOU EVER FELT YOU SHOULD CUT DOWN ON YOUR DRINKING: NO

## 2024-01-24 ASSESSMENT — PAIN SCALES - GENERAL: PAINLEVEL_OUTOF10: 9

## 2024-01-24 ASSESSMENT — PAIN - FUNCTIONAL ASSESSMENT: PAIN_FUNCTIONAL_ASSESSMENT: 0-10

## 2024-01-24 NOTE — Clinical Note
Fatou Zambrano was seen and treated in our emergency department on 1/24/2024.  She may return to work on 01/27/2024.       If you have any questions or concerns, please don't hesitate to call.      Ashley Ragland, APRN-CNP

## 2024-01-25 LAB
APPEARANCE UR: CLEAR
BILIRUB UR STRIP.AUTO-MCNC: NEGATIVE MG/DL
COLOR UR: YELLOW
GLUCOSE UR STRIP.AUTO-MCNC: NEGATIVE MG/DL
HOLD SPECIMEN: NORMAL
KETONES UR STRIP.AUTO-MCNC: NEGATIVE MG/DL
LEUKOCYTE ESTERASE UR QL STRIP.AUTO: ABNORMAL
MUCOUS THREADS #/AREA URNS AUTO: NORMAL /LPF
NITRITE UR QL STRIP.AUTO: NEGATIVE
PH UR STRIP.AUTO: 6 [PH]
PROT UR STRIP.AUTO-MCNC: NEGATIVE MG/DL
RBC # UR STRIP.AUTO: NEGATIVE /UL
RBC #/AREA URNS AUTO: NORMAL /HPF
SP GR UR STRIP.AUTO: 1.01
SQUAMOUS #/AREA URNS AUTO: NORMAL /HPF
UROBILINOGEN UR STRIP.AUTO-MCNC: <2 MG/DL
WBC #/AREA URNS AUTO: NORMAL /HPF

## 2024-01-25 NOTE — ED PROVIDER NOTES
HPI   Chief Complaint   Patient presents with    Headache    Depression         History provided by:  Patient   used: No      23-year-old female presents. For evaluation of 2 complaints.  Patient states that she has had a generalized headache onset yesterday.  She endorses associated intermittent light sensitivity but denies any phonophobia, neck pain, fevers, chills, numbness, tingling, weakness, incontinence of bowel or bladder, vision changes or dizziness.  She is taken no medications for symptoms.  Denies any known sick contacts.  She states that she needs a work excuse due to calling off.    Additionally patient states that she has been depressed since she had her baby and has a psychiatrist for this and she has told her OB/GYN in the past but does not remember what she actually said, she does take medications for this compliantly.  She denies any homicidal or suicidal ideation. She denies any hallucinations and states that she is on no psych medications at home.  She denies any trauma, fall, injury, recent travel, large crowds, smoking, drugs or alcohol.  Denies any additional symptoms or complaints this time.    ROS is otherwise negative unless stated above.                  No data recorded                Patient History   Past Medical History:   Diagnosis Date    Other specified health status 2021    No pertinent past medical history     Past Surgical History:   Procedure Laterality Date    OTHER SURGICAL HISTORY  2021     section     No family history on file.  Social History     Tobacco Use    Smoking status: Unknown    Smokeless tobacco: Not on file   Substance Use Topics    Alcohol use: Not on file    Drug use: Not on file       Physical Exam   ED Triage Vitals [24]   Temperature Heart Rate Resp BP   36.2 °C (97.2 °F) 85 -- 122/85      SpO2 Temp src Heart Rate Source Patient Position   -- -- -- --      BP Location FiO2 (%)     -- --       Physical  Exam    VS: As documented in the triage note and EMR flowsheet from this visit were reviewed.    GEN: NAD, nontoxic, well-appearing, ambulates without difficulty  EYES:  EOMs grossly intact, anicteric sclera, no nystagmus noted, clear and equal bilaterally, no foreign body noted  HEENT: Airway patent, ears with clear tympanic membranes bilaterally. Nasal mucosa clear. Mouth with normal mucosa.  No area of abscess or fluctuance noted.  No drainage noted. Throat has no vesicles, no oropharyngeal exudates and uvula is midline. Face with no lymph node enlargement. No trismus or drooling noted.  Handling secretions.  Speech clear.  CARD: RRR, nontender chest, no crepitus deformities, no JVD, no murmurs rubs or gallops ; No edema noted.  Positive pulses bilaterally throughout.  Capillary refill less than 3 seconds.  No abnormal redness, warmth, tenderness or swelling noted to bilateral lower extremities.  PULMONARY: Clear all lung fields. Moving air well, Nonlabored, no accessory muscle use, able to speak complete sentences  ABDOMEN: Abdomen soft, non-distended, no rebound, no guarding. Bowel sounds normal in all 4 quadrants. No tenderness to palpation.  No CVA tenderness.  No masses or organomegaly noted.  No evidence of peritonitis. Negative Woodard's and McBurney's point tenderness.  No Rovsing's.  : deferred  MUSK: Spine appears normal, range of motion is not limited, no muscle or joint tenderness. Strength 5 out of 5 equal bilaterally throughout.  No step-offs, deformities or additional signs of trauma noted.  No spinal/midline tenderness to palpation  SKIN: Skin normal color for race, warm, dry and intact. No evidence of trauma. No rash noted.  NEURO: Alert and oriented x 3, speech is clear, no obvious deficits noted. No facial droop noted.  Speech clear.  Negative Kernig's and Brudzinski sign.  PSYCH: Alert and oriented to person, place, time/situation.  Depressed/labile mood and affect. No apparent risk to self  or others. Thoughts are linear.  Does not appear decompensated.  Does not appear internally stimulated.  LYMPH: No adenopathy or splenomegaly. No cervical, supraclavicular or inguinal lymphadenopathy.    ED Course & MDM   Diagnoses as of 01/25/24 0024   Acute nonintractable headache, unspecified headache type       Medical Decision Making    Upon assessment patient is a healthy nontoxic-appearing female in no apparent distress.  She is ambulating independently in the ED without difficulty or dyspnea.  Assessment is benign, no red flag signs for acute intracranial process or meningitis.  She is deemed not a danger to herself or others, thoughts are linear and she does not appear decompensated from a psychiatric standpoint.  There is no indication for EPAT consultation at this time.  EKG and psychiatric studies were ordered in triage via NIPP which were reviewed, she does have a small amount of leukocytes on her urinalysis but she is asymptomatic therefore I did not treat this as a UTI at this time.  She is given Toradol IV for her symptoms.    Upon reevaluation patient states that she feels improved and reassured.  She remained stable and hemodynamically stable to ED course of care.  She is cleared from a clinical standpoint and is provided a work excuse.  Findings and plan were discussed with patient and she is agreeable with plan and acknowledged understanding.      EKG Interpretation: Normal sinus rhythm with sinus rhythm at a rate of 75, , QRS 72,  without evidence of STEMI or ischemia    Differential Diagnoses Considered: Suicidal ideation, depression, anxiety, headache, migraine    Chronic Medical Conditions Significantly Affecting Care: None    External Records Reviewed: I reviewed recent and relevant outside records including: PCP notes, prior discharge summary, previous radiologic studies, HIE    Independent Interpretation of Studies:  I independently interpreted: None    Escalation of  Care:  Appropriate for outpatient management with primary care provider and psychiatry follow-up as scheduled in the next week for reevaluation.  Return precautions discussed and patient verbalized understanding. All questions and concerns were addressed.    Social Determinants of Health Significantly Affecting Care:  None    Prescription Drug Consideration: N/A; educated to continue over-the-counter ibuprofen and Tylenol as needed for any headaches    Diagnostic testing considered: No additional indicated    Discussion of Management with Other Providers:   I discussed the patient/results with: None      *Please note that portions of this note may have been completed with a voice recognition program.  Efforts were made to edit the dictations but occasionally, words are mis-transcribed.      Procedure  Procedures     Ashley Ragland, YAW-CNP  01/25/24 0031

## 2024-01-25 NOTE — ED TRIAGE NOTES
"PT to the ED for headache since yesterday additional complaints of lower back pain. Lower back pain onset June 2023 after having her child. PT endorsing depression, stating I constantly cry and \"dont want to be here.\"   "

## 2024-02-14 ENCOUNTER — APPOINTMENT (OUTPATIENT)
Dept: PRIMARY CARE | Facility: HOSPITAL | Age: 24
End: 2024-02-14
Payer: COMMERCIAL

## 2024-03-21 ENCOUNTER — APPOINTMENT (OUTPATIENT)
Dept: OBSTETRICS AND GYNECOLOGY | Facility: HOSPITAL | Age: 24
End: 2024-03-21
Payer: COMMERCIAL

## 2024-03-21 RX ORDER — MEDROXYPROGESTERONE ACETATE 150 MG/ML
150 INJECTION, SUSPENSION INTRAMUSCULAR ONCE
Status: COMPLETED | OUTPATIENT
Start: 2024-03-21 | End: 2024-04-03

## 2024-03-22 ENCOUNTER — APPOINTMENT (OUTPATIENT)
Dept: OBSTETRICS AND GYNECOLOGY | Facility: HOSPITAL | Age: 24
End: 2024-03-22
Payer: COMMERCIAL

## 2024-03-25 ENCOUNTER — APPOINTMENT (OUTPATIENT)
Dept: OBSTETRICS AND GYNECOLOGY | Facility: HOSPITAL | Age: 24
End: 2024-03-25
Payer: COMMERCIAL

## 2024-03-26 ENCOUNTER — APPOINTMENT (OUTPATIENT)
Dept: OBSTETRICS AND GYNECOLOGY | Facility: HOSPITAL | Age: 24
End: 2024-03-26
Payer: COMMERCIAL

## 2024-03-28 ENCOUNTER — APPOINTMENT (OUTPATIENT)
Dept: OBSTETRICS AND GYNECOLOGY | Facility: HOSPITAL | Age: 24
End: 2024-03-28
Payer: COMMERCIAL

## 2024-04-01 ENCOUNTER — APPOINTMENT (OUTPATIENT)
Dept: OBSTETRICS AND GYNECOLOGY | Facility: HOSPITAL | Age: 24
End: 2024-04-01
Payer: COMMERCIAL

## 2024-04-02 ENCOUNTER — APPOINTMENT (OUTPATIENT)
Dept: OBSTETRICS AND GYNECOLOGY | Facility: HOSPITAL | Age: 24
End: 2024-04-02
Payer: COMMERCIAL

## 2024-04-03 ENCOUNTER — PROCEDURE VISIT (OUTPATIENT)
Dept: OBSTETRICS AND GYNECOLOGY | Facility: HOSPITAL | Age: 24
End: 2024-04-03
Payer: COMMERCIAL

## 2024-04-03 VITALS — BODY MASS INDEX: 28.7 KG/M2 | SYSTOLIC BLOOD PRESSURE: 113 MMHG | WEIGHT: 162 LBS | DIASTOLIC BLOOD PRESSURE: 79 MMHG

## 2024-04-03 DIAGNOSIS — Z30.42 ENCOUNTER FOR SURVEILLANCE OF INJECTABLE CONTRACEPTIVE: Primary | ICD-10-CM

## 2024-04-03 PROCEDURE — 2500000004 HC RX 250 GENERAL PHARMACY W/ HCPCS (ALT 636 FOR OP/ED): Performed by: OBSTETRICS & GYNECOLOGY

## 2024-04-03 PROCEDURE — 96372 THER/PROPH/DIAG INJ SC/IM: CPT | Performed by: OBSTETRICS & GYNECOLOGY

## 2024-04-03 RX ADMIN — MEDROXYPROGESTERONE ACETATE 150 MG: 150 INJECTION, SUSPENSION INTRAMUSCULAR at 15:45

## 2024-04-03 ASSESSMENT — ENCOUNTER SYMPTOMS
DEPRESSION: 0
LOSS OF SENSATION IN FEET: 0
OCCASIONAL FEELINGS OF UNSTEADINESS: 0

## 2024-04-03 ASSESSMENT — PAIN SCALES - GENERAL: PAINLEVEL: 0-NO PAIN

## 2024-04-03 NOTE — PROGRESS NOTES
Patient here for depo  Patient last depo 1-2-24  Patient last annual 10-16-23  Patient given depo in right deltoid  Patient tolerated injection well  Patient educated on long term depo use  Patient verbalized understanding  Patient given dates to return for next depo injection 6-19-24 to 7-3-24  Depo supplied by davin Galarza LPN

## 2024-10-03 ENCOUNTER — HOSPITAL ENCOUNTER (EMERGENCY)
Facility: HOSPITAL | Age: 24
Discharge: LEFT BEFORE DISCHARGE | End: 2024-10-03
Payer: COMMERCIAL

## 2024-10-03 VITALS
SYSTOLIC BLOOD PRESSURE: 138 MMHG | TEMPERATURE: 98.7 F | OXYGEN SATURATION: 99 % | RESPIRATION RATE: 16 BRPM | WEIGHT: 162 LBS | BODY MASS INDEX: 27.66 KG/M2 | HEART RATE: 79 BPM | HEIGHT: 64 IN | DIASTOLIC BLOOD PRESSURE: 84 MMHG

## 2024-10-03 PROCEDURE — 4500999001 HC ED NO CHARGE

## 2024-10-03 ASSESSMENT — PAIN SCALES - GENERAL: PAINLEVEL_OUTOF10: 0 - NO PAIN

## 2024-10-03 ASSESSMENT — COLUMBIA-SUICIDE SEVERITY RATING SCALE - C-SSRS
6. HAVE YOU EVER DONE ANYTHING, STARTED TO DO ANYTHING, OR PREPARED TO DO ANYTHING TO END YOUR LIFE?: NO
2. HAVE YOU ACTUALLY HAD ANY THOUGHTS OF KILLING YOURSELF?: NO
1. IN THE PAST MONTH, HAVE YOU WISHED YOU WERE DEAD OR WISHED YOU COULD GO TO SLEEP AND NOT WAKE UP?: NO

## 2024-10-03 ASSESSMENT — PAIN - FUNCTIONAL ASSESSMENT: PAIN_FUNCTIONAL_ASSESSMENT: 0-10

## 2024-10-03 ASSESSMENT — PAIN DESCRIPTION - PAIN TYPE: TYPE: ACUTE PAIN

## 2024-10-04 NOTE — ED TRIAGE NOTES
Patient presents to the ED for evaluation of multiple complaints that began today. She states that she noticed slight swelling under her left eye today that started out of nowhere. Patient notes that she also began having an itchy sensation in her throat. She did take Benadryl with no relief of symptoms. Patient denies being exposed to any allergens.

## 2024-10-29 ENCOUNTER — CLINICAL SUPPORT (OUTPATIENT)
Dept: OBSTETRICS AND GYNECOLOGY | Facility: HOSPITAL | Age: 24
End: 2024-10-29
Payer: COMMERCIAL

## 2024-10-29 VITALS
WEIGHT: 154 LBS | DIASTOLIC BLOOD PRESSURE: 69 MMHG | HEIGHT: 65 IN | BODY MASS INDEX: 25.66 KG/M2 | SYSTOLIC BLOOD PRESSURE: 124 MMHG | HEART RATE: 98 BPM

## 2024-10-29 DIAGNOSIS — N92.6 MISSED MENSES: Primary | ICD-10-CM

## 2024-10-29 LAB — PREGNANCY TEST URINE, POC: NEGATIVE

## 2024-10-29 PROCEDURE — 81025 URINE PREGNANCY TEST: CPT | Performed by: STUDENT IN AN ORGANIZED HEALTH CARE EDUCATION/TRAINING PROGRAM

## 2024-10-29 PROCEDURE — 81025 URINE PREGNANCY TEST: CPT

## 2024-11-19 ENCOUNTER — APPOINTMENT (OUTPATIENT)
Dept: PRIMARY CARE | Facility: CLINIC | Age: 24
End: 2024-11-19
Payer: COMMERCIAL

## 2024-12-24 ENCOUNTER — INITIAL PRENATAL (OUTPATIENT)
Dept: OBSTETRICS AND GYNECOLOGY | Facility: HOSPITAL | Age: 24
End: 2024-12-24
Payer: COMMERCIAL

## 2024-12-24 VITALS — BODY MASS INDEX: 26.19 KG/M2 | WEIGHT: 155 LBS | DIASTOLIC BLOOD PRESSURE: 77 MMHG | SYSTOLIC BLOOD PRESSURE: 111 MMHG

## 2024-12-24 DIAGNOSIS — Z87.74 HISTORY OF CONGENITAL HEART DEFECT: ICD-10-CM

## 2024-12-24 DIAGNOSIS — Z3A.01 LESS THAN 8 WEEKS GESTATION OF PREGNANCY (HHS-HCC): Primary | ICD-10-CM

## 2024-12-24 DIAGNOSIS — O34.219 UTERINE SCAR FROM PREVIOUS CESAREAN DELIVERY AFFECTING PREGNANCY (HHS-HCC): ICD-10-CM

## 2024-12-24 PROBLEM — A54.9 GONORRHEA: Status: RESOLVED | Noted: 2017-05-30 | Resolved: 2024-12-24

## 2024-12-24 PROBLEM — D64.9 NORMOCYTIC ANEMIA: Status: RESOLVED | Noted: 2023-10-14 | Resolved: 2024-12-24

## 2024-12-24 PROBLEM — A74.9 CHLAMYDIAL INFECTION: Status: RESOLVED | Noted: 2017-05-30 | Resolved: 2024-12-24

## 2024-12-24 PROBLEM — M62.08 DIASTASIS RECTI: Status: RESOLVED | Noted: 2023-10-14 | Resolved: 2024-12-24

## 2024-12-24 PROBLEM — O36.8390: Status: RESOLVED | Noted: 2023-10-14 | Resolved: 2024-12-24

## 2024-12-24 PROBLEM — O99.611 CONSTIPATION DURING PREGNANCY IN FIRST TRIMESTER (HHS-HCC): Status: RESOLVED | Noted: 2023-10-14 | Resolved: 2024-12-24

## 2024-12-24 PROBLEM — Z3A.10 10 WEEKS GESTATION OF PREGNANCY (HHS-HCC): Status: RESOLVED | Noted: 2023-10-14 | Resolved: 2024-12-24

## 2024-12-24 PROBLEM — O99.011 ANEMIA DURING PREGNANCY IN FIRST TRIMESTER (HHS-HCC): Status: RESOLVED | Noted: 2023-10-14 | Resolved: 2024-12-24

## 2024-12-24 PROBLEM — O03.9 MISCARRIAGE (HHS-HCC): Status: RESOLVED | Noted: 2023-10-14 | Resolved: 2024-12-24

## 2024-12-24 PROBLEM — R31.9 HEMATURIA: Status: RESOLVED | Noted: 2023-10-14 | Resolved: 2024-12-24

## 2024-12-24 PROBLEM — O09.41 HIGH RISK MULTIGRAVIDA IN FIRST TRIMESTER (HHS-HCC): Status: RESOLVED | Noted: 2023-10-14 | Resolved: 2024-12-24

## 2024-12-24 PROBLEM — K59.00 CONSTIPATION DURING PREGNANCY IN FIRST TRIMESTER (HHS-HCC): Status: RESOLVED | Noted: 2023-10-14 | Resolved: 2024-12-24

## 2024-12-24 PROBLEM — O46.90 VAGINAL BLEEDING IN PREGNANCY (HHS-HCC): Status: RESOLVED | Noted: 2023-10-14 | Resolved: 2024-12-24

## 2024-12-24 PROBLEM — O36.8390 FETAL ARRHYTHMIA AFFECTING PREGNANCY, ANTEPARTUM (HHS-HCC): Status: RESOLVED | Noted: 2023-10-14 | Resolved: 2024-12-24

## 2024-12-24 PROBLEM — Z34.80 PRENATAL CARE, SUBSEQUENT PREGNANCY (HHS-HCC): Status: RESOLVED | Noted: 2023-10-14 | Resolved: 2024-12-24

## 2024-12-24 LAB — PREGNANCY TEST URINE, POC: POSITIVE

## 2024-12-24 PROCEDURE — 81025 URINE PREGNANCY TEST: CPT

## 2024-12-24 PROCEDURE — 87491 CHLMYD TRACH DNA AMP PROBE: CPT

## 2024-12-24 PROCEDURE — 87086 URINE CULTURE/COLONY COUNT: CPT

## 2024-12-24 PROCEDURE — 99213 OFFICE O/P EST LOW 20 MIN: CPT

## 2024-12-24 PROCEDURE — 87661 TRICHOMONAS VAGINALIS AMPLIF: CPT

## 2024-12-24 RX ORDER — FERROUS SULFATE 325(65) MG
325 TABLET ORAL
Qty: 90 TABLET | Refills: 3 | Status: SHIPPED | OUTPATIENT
Start: 2024-12-24 | End: 2025-12-24

## 2024-12-24 SDOH — ECONOMIC STABILITY: FOOD INSECURITY: WITHIN THE PAST 12 MONTHS, THE FOOD YOU BOUGHT JUST DIDN'T LAST AND YOU DIDN'T HAVE MONEY TO GET MORE.: NEVER TRUE

## 2024-12-24 SDOH — ECONOMIC STABILITY: FOOD INSECURITY: WITHIN THE PAST 12 MONTHS, YOU WORRIED THAT YOUR FOOD WOULD RUN OUT BEFORE YOU GOT MONEY TO BUY MORE.: NEVER TRUE

## 2024-12-24 ASSESSMENT — EDINBURGH POSTNATAL DEPRESSION SCALE (EPDS)
I HAVE BEEN SO UNHAPPY THAT I HAVE HAD DIFFICULTY SLEEPING: NOT AT ALL
TOTAL SCORE: 2
THE THOUGHT OF HARMING MYSELF HAS OCCURRED TO ME: NEVER
I HAVE BEEN ABLE TO LAUGH AND SEE THE FUNNY SIDE OF THINGS: AS MUCH AS I ALWAYS COULD
I HAVE FELT SCARED OR PANICKY FOR NO GOOD REASON: NO, NOT AT ALL
I HAVE LOOKED FORWARD WITH ENJOYMENT TO THINGS: AS MUCH AS I EVER DID
I HAVE BEEN ANXIOUS OR WORRIED FOR NO GOOD REASON: YES, SOMETIMES
I HAVE FELT SAD OR MISERABLE: NO, NOT AT ALL
I HAVE BLAMED MYSELF UNNECESSARILY WHEN THINGS WENT WRONG: NO, NEVER
THINGS HAVE BEEN GETTING ON TOP OF ME: NO, I HAVE BEEN COPING AS WELL AS EVER
I HAVE BEEN SO UNHAPPY THAT I HAVE BEEN CRYING: NO, NEVER

## 2024-12-24 ASSESSMENT — SOCIAL DETERMINANTS OF HEALTH (SDOH)
WITHIN THE LAST YEAR, HAVE TO BEEN RAPED OR FORCED TO HAVE ANY KIND OF SEXUAL ACTIVITY BY YOUR PARTNER OR EX-PARTNER?: NO
WITHIN THE LAST YEAR, HAVE YOU BEEN KICKED, HIT, SLAPPED, OR OTHERWISE PHYSICALLY HURT BY YOUR PARTNER OR EX-PARTNER?: NO
WITHIN THE LAST YEAR, HAVE YOU BEEN AFRAID OF YOUR PARTNER OR EX-PARTNER?: NO
WITHIN THE LAST YEAR, HAVE YOU BEEN HUMILIATED OR EMOTIONALLY ABUSED IN OTHER WAYS BY YOUR PARTNER OR EX-PARTNER?: NO

## 2024-12-24 ASSESSMENT — PATIENT HEALTH QUESTIONNAIRE - PHQ9
SUM OF ALL RESPONSES TO PHQ9 QUESTIONS 1 & 2: 2
10. IF YOU CHECKED OFF ANY PROBLEMS, HOW DIFFICULT HAVE THESE PROBLEMS MADE IT FOR YOU TO DO YOUR WORK, TAKE CARE OF THINGS AT HOME, OR GET ALONG WITH OTHER PEOPLE: NOT DIFFICULT AT ALL
2. FEELING DOWN, DEPRESSED OR HOPELESS: SEVERAL DAYS
1. LITTLE INTEREST OR PLEASURE IN DOING THINGS: SEVERAL DAYS

## 2024-12-24 NOTE — PROGRESS NOTES
Benito Zambrano is a 24 y.o.  at 4w5d with a working estimated date of delivery of 2025, by Last Menstrual Period who presents for an initial prenatal visit. This pregnancy is unplanned and accepted.    Patient currently experiencing:  N/A  Bleeding or cramping since LMP: no  Taking prenatal vitamin: No  Other concerns today: N/A  Ultrasound completed this pregnancy: No  Last pap: 2022; WNL    OB History    Para Term  AB Living   4 2 2   1 2   SAB IAB Ectopic Multiple Live Births   1       2      # Outcome Date GA Lbr Waylon/2nd Weight Sex Type Anes PTL Lv   4 Current            3 Term 23    M CS-LTranv EPI N AB   2 SAB 22           1 Term 21   2.863 kg F CS-LTranv EPI N AB     Prior pregnancy complications: anemia  History of hypertension:  No    Past Medical History:   Diagnosis Date    Other specified health status 2021    No pertinent past medical history      Past Surgical History:   Procedure Laterality Date    OTHER SURGICAL HISTORY  2021     section      Social History     Socioeconomic History    Marital status: Single   Tobacco Use    Smoking status: Never    Smokeless tobacco: Never   Vaping Use    Vaping status: Never Used   Substance and Sexual Activity    Alcohol use: Never    Drug use: Never    Sexual activity: Yes     Partners: Male     Social Drivers of Health     Food Insecurity: No Food Insecurity (2024)    Hunger Vital Sign     Worried About Running Out of Food in the Last Year: Never true     Ran Out of Food in the Last Year: Never true   Stress: No Stress Concern Present (2024)    [employer] [place] [employer] - Occupational Stress Questionnaire     Feeling of Stress : Only a little   Intimate Partner Violence: Not At Risk (2024)    Humiliation, Afraid, Rape, and Kick questionnaire     Fear of Current or Ex-Partner: No     Emotionally Abused: No     Physically Abused: No      Sexually Abused: No      [place]  Depression Scale Total: 2    Objective   Physical Exam  Weight: 70.3 kg (155 lb)  TW kg (0 lb)   Pregravid BMI: 26.20  BP: 111/77    Physical Exam  Constitutional:       Appearance: Normal appearance.   HENT:      Head: Normocephalic and atraumatic.      Mouth/Throat:      Mouth: Mucous membranes are moist.   Cardiovascular:      Rate and Rhythm: Normal rate and regular rhythm.      Heart sounds: Normal heart sounds.   Pulmonary:      Effort: Pulmonary effort is normal.      Breath sounds: Normal breath sounds.   Musculoskeletal:         General: Normal range of motion.      Cervical back: Normal range of motion and neck supple.   Neurological:      Mental Status: She is alert and oriented to person, place, and time.   Skin:     General: Skin is warm and dry.   Psychiatric:         Mood and Affect: Mood normal.         Behavior: Behavior normal.         Thought Content: Thought content normal.         Judgment: Judgment normal.          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         Less than 8 weeks gestation of pregnancy ([facility]) - Primary    Overview     Desired provider in labor: [] CNM  [] Physician  [x] Blood Products: [x] Yes, accepts [] No, needs counseling  [x] Initial BMI: 26.20  [] Prenatal Labs:   [x] Cervical Cancer Screening up to date due PP  [] Rh status:   [] Genetic Screening:    [] NT US: (11-13 wks)  [] Baby ASA (if indicated):  [] Pregnancy dated by:     [] Anatomy US: (19-20 wks)  [] Federal Sterilization consent signed (if indicated):  [] 1hr GCT at 24-28wks:  [] Rhogam (if indicated):   [] Fetal Surveillance (if indicated):  [] Tdap (27-32 wks, may be given up to 36 wks if initial window missed):   [] RSV (32-36 wks) (Sept. to end of ):   [] Flu  Vaccine:    [] Breastfeeding:  [x] Postpartum Birth control method: PPTL  [] GBS at 36 - 37 wks:  [] 39 weeks discussion of IOL vs. Expectant management:  [] Mode of delivery ( anticipated ):           Relevant Medications    prenatal vitamin, iron-folic, 27 mg iron-800 mcg folic acid tablet    ferrous sulfate, 325 mg ferrous sulfate, tablet    Other Relevant Orders    US MAC OB imaging order    CBC Anemia Panel With Reflex    Hep B Core Ab, Total    Hep B surface Ag    Hep B Surface Ab    Hep C Ab    HIV 1/2 Screen with Reflex    Hgb  A1C    Hemoglobin identification with path review    Rubella Ab, IgG    Syphilis Screen with Reflex    Type And Screen    Urine Culture clinic collect    Urine GC / Chlam clinic collect    Trichomonas vaginalis, Amplified    POCT pregnancy, urine manually resulted (Completed)        Plan   - New OB resources provided and reviewed with particular attention to dietary, travel, and medication restrictions  - Oriented to practice, CNM vs. MD care  - Reviewed IOM recommendations for weight gain given pt's BMI: 15-25 pounds (BMI 25-29.9)  - Reviewed bleeding precautions, warning signs, when to call provider; phone number provided  - The following Rx were sent to pharmacy: PNV, Po iron  - Routine NOB labs ordered  - Dating ultrasound ordered  - Return in 4 weeks for routine prenatal care    YAW Hills-AGAPITOM

## 2024-12-24 NOTE — LETTER
12/24/2024    To Whom It May Concern:    Fatou Zambrano is being followed for her pregnancy at [facility].  Estimated Date of Delivery: 8/28/25    Sincerely,        JAYLYN Hills  [facility]

## 2024-12-24 NOTE — LETTER
12/24/2024    To Whom It May Concern:    This is to certify that my patient,Fatou Zambrano is under my care for her pregnancy.    The following guidelines may be used for any dental work:  You may use a local anesthetic with or without Epinephrine.  You may prescribe any Penicillin or Cephalosporin if an antibiotic is necessary. (Dependent on allergy history)  You may take x-rays with a lead apron if necessary.  You may prescribe Tylenol and/or narcotics for pain.  You may extract teeth if necessary.    If you need further information or if you have any concerns, please contact our office.    Sincerely,        JAYLYN Hills  [facility]

## 2024-12-25 LAB
BACTERIA UR CULT: NORMAL
C TRACH RRNA SPEC QL NAA+PROBE: NEGATIVE
N GONORRHOEA DNA SPEC QL PROBE+SIG AMP: NEGATIVE
T VAGINALIS RRNA SPEC QL NAA+PROBE: NEGATIVE

## 2024-12-27 ENCOUNTER — APPOINTMENT (OUTPATIENT)
Dept: PRIMARY CARE | Facility: CLINIC | Age: 24
End: 2024-12-27
Payer: COMMERCIAL

## 2025-01-06 ENCOUNTER — APPOINTMENT (OUTPATIENT)
Dept: PRIMARY CARE | Facility: CLINIC | Age: 25
End: 2025-01-06
Payer: COMMERCIAL

## 2025-01-10 ENCOUNTER — TELEPHONE (OUTPATIENT)
Dept: OBSTETRICS AND GYNECOLOGY | Facility: HOSPITAL | Age: 25
End: 2025-01-10

## 2025-01-10 ENCOUNTER — PATIENT MESSAGE (OUTPATIENT)
Dept: OBSTETRICS AND GYNECOLOGY | Facility: HOSPITAL | Age: 25
End: 2025-01-10

## 2025-01-10 ENCOUNTER — HOSPITAL ENCOUNTER (OUTPATIENT)
Dept: RADIOLOGY | Facility: HOSPITAL | Age: 25
Discharge: HOME | End: 2025-01-10
Payer: COMMERCIAL

## 2025-01-10 ENCOUNTER — LAB (OUTPATIENT)
Dept: LAB | Facility: LAB | Age: 25
End: 2025-01-10
Payer: COMMERCIAL

## 2025-01-10 DIAGNOSIS — O21.9 NAUSEA AND VOMITING IN PREGNANCY: ICD-10-CM

## 2025-01-10 DIAGNOSIS — Z3A.01 LESS THAN 8 WEEKS GESTATION OF PREGNANCY (HHS-HCC): ICD-10-CM

## 2025-01-10 LAB
ABO GROUP (TYPE) IN BLOOD: NORMAL
ANTIBODY SCREEN: NORMAL
ERYTHROCYTE [DISTWIDTH] IN BLOOD BY AUTOMATED COUNT: 12.8 % (ref 11.5–14.5)
EST. AVERAGE GLUCOSE BLD GHB EST-MCNC: 111 MG/DL
HBA1C MFR BLD: 5.5 %
HBV CORE AB SER QL: NONREACTIVE
HBV SURFACE AB SER-ACNC: <3.1 MIU/ML
HBV SURFACE AG SERPL QL IA: NONREACTIVE
HCT VFR BLD AUTO: 32.8 % (ref 36–46)
HCV AB SER QL: NONREACTIVE
HGB BLD-MCNC: 11.6 G/DL (ref 12–16)
HIV 1+2 AB+HIV1 P24 AG SERPL QL IA: NONREACTIVE
MCH RBC QN AUTO: 28.9 PG (ref 26–34)
MCHC RBC AUTO-ENTMCNC: 35.4 G/DL (ref 32–36)
MCV RBC AUTO: 82 FL (ref 80–100)
NRBC BLD-RTO: 0 /100 WBCS (ref 0–0)
PLATELET # BLD AUTO: 324 X10*3/UL (ref 150–450)
RBC # BLD AUTO: 4.02 X10*6/UL (ref 4–5.2)
REFLEX ADDED, ANEMIA PANEL: NORMAL
RH FACTOR (ANTIGEN D): NORMAL
RUBV IGG SERPL IA-ACNC: 1.2 IA
RUBV IGG SERPL QL IA: POSITIVE
TREPONEMA PALLIDUM IGG+IGM AB [PRESENCE] IN SERUM OR PLASMA BY IMMUNOASSAY: NONREACTIVE
WBC # BLD AUTO: 9.9 X10*3/UL (ref 4.4–11.3)

## 2025-01-10 PROCEDURE — 99283 EMERGENCY DEPT VISIT LOW MDM: CPT | Performed by: EMERGENCY MEDICINE

## 2025-01-10 PROCEDURE — 86317 IMMUNOASSAY INFECTIOUS AGENT: CPT

## 2025-01-10 PROCEDURE — 85027 COMPLETE CBC AUTOMATED: CPT

## 2025-01-10 PROCEDURE — 86706 HEP B SURFACE ANTIBODY: CPT

## 2025-01-10 PROCEDURE — 86900 BLOOD TYPING SEROLOGIC ABO: CPT

## 2025-01-10 PROCEDURE — 86803 HEPATITIS C AB TEST: CPT

## 2025-01-10 PROCEDURE — 86901 BLOOD TYPING SEROLOGIC RH(D): CPT

## 2025-01-10 PROCEDURE — 99284 EMERGENCY DEPT VISIT MOD MDM: CPT

## 2025-01-10 PROCEDURE — 76801 OB US < 14 WKS SINGLE FETUS: CPT

## 2025-01-10 PROCEDURE — 86850 RBC ANTIBODY SCREEN: CPT

## 2025-01-10 PROCEDURE — 86704 HEP B CORE ANTIBODY TOTAL: CPT

## 2025-01-10 PROCEDURE — 87389 HIV-1 AG W/HIV-1&-2 AB AG IA: CPT

## 2025-01-10 PROCEDURE — 83036 HEMOGLOBIN GLYCOSYLATED A1C: CPT

## 2025-01-10 PROCEDURE — 87340 HEPATITIS B SURFACE AG IA: CPT

## 2025-01-10 PROCEDURE — 86780 TREPONEMA PALLIDUM: CPT

## 2025-01-11 ENCOUNTER — HOSPITAL ENCOUNTER (EMERGENCY)
Facility: HOSPITAL | Age: 25
Discharge: HOME | End: 2025-01-11
Attending: EMERGENCY MEDICINE
Payer: COMMERCIAL

## 2025-01-11 VITALS
WEIGHT: 154 LBS | SYSTOLIC BLOOD PRESSURE: 126 MMHG | TEMPERATURE: 97.7 F | BODY MASS INDEX: 26.29 KG/M2 | OXYGEN SATURATION: 99 % | HEIGHT: 64 IN | RESPIRATION RATE: 18 BRPM | HEART RATE: 94 BPM | DIASTOLIC BLOOD PRESSURE: 84 MMHG

## 2025-01-11 DIAGNOSIS — O26.899 ABDOMINAL PAIN AFFECTING PREGNANCY (HHS-HCC): Primary | ICD-10-CM

## 2025-01-11 DIAGNOSIS — R10.9 ABDOMINAL PAIN AFFECTING PREGNANCY (HHS-HCC): Primary | ICD-10-CM

## 2025-01-11 LAB
ALBUMIN SERPL BCP-MCNC: 4.5 G/DL (ref 3.4–5)
ALP SERPL-CCNC: 64 U/L (ref 33–110)
ALT SERPL W P-5'-P-CCNC: 7 U/L (ref 7–45)
ANION GAP SERPL CALC-SCNC: 12 MMOL/L (ref 10–20)
APPEARANCE UR: ABNORMAL
AST SERPL W P-5'-P-CCNC: 12 U/L (ref 9–39)
B-HCG SERPL-ACNC: ABNORMAL MIU/ML
BASOPHILS # BLD AUTO: 0.05 X10*3/UL (ref 0–0.1)
BASOPHILS NFR BLD AUTO: 0.5 %
BILIRUB SERPL-MCNC: 0.3 MG/DL (ref 0–1.2)
BILIRUB UR STRIP.AUTO-MCNC: NEGATIVE MG/DL
BUN SERPL-MCNC: 7 MG/DL (ref 6–23)
CALCIUM SERPL-MCNC: 9.5 MG/DL (ref 8.6–10.6)
CHLORIDE SERPL-SCNC: 100 MMOL/L (ref 98–107)
CO2 SERPL-SCNC: 26 MMOL/L (ref 21–32)
COLOR UR: ABNORMAL
CREAT SERPL-MCNC: 0.65 MG/DL (ref 0.5–1.05)
EGFRCR SERPLBLD CKD-EPI 2021: >90 ML/MIN/1.73M*2
EOSINOPHIL # BLD AUTO: 0.09 X10*3/UL (ref 0–0.7)
EOSINOPHIL NFR BLD AUTO: 0.8 %
ERYTHROCYTE [DISTWIDTH] IN BLOOD BY AUTOMATED COUNT: 12.5 % (ref 11.5–14.5)
GLUCOSE SERPL-MCNC: 92 MG/DL (ref 74–99)
GLUCOSE UR STRIP.AUTO-MCNC: NORMAL MG/DL
HCT VFR BLD AUTO: 30.7 % (ref 36–46)
HGB BLD-MCNC: 11.1 G/DL (ref 12–16)
HOLD SPECIMEN: NORMAL
IMM GRANULOCYTES # BLD AUTO: 0.02 X10*3/UL (ref 0–0.7)
IMM GRANULOCYTES NFR BLD AUTO: 0.2 % (ref 0–0.9)
KETONES UR STRIP.AUTO-MCNC: NEGATIVE MG/DL
LEUKOCYTE ESTERASE UR QL STRIP.AUTO: NEGATIVE
LIPASE SERPL-CCNC: 23 U/L (ref 9–82)
LYMPHOCYTES # BLD AUTO: 2.91 X10*3/UL (ref 1.2–4.8)
LYMPHOCYTES NFR BLD AUTO: 26.4 %
MCH RBC QN AUTO: 28.5 PG (ref 26–34)
MCHC RBC AUTO-ENTMCNC: 36.2 G/DL (ref 32–36)
MCV RBC AUTO: 79 FL (ref 80–100)
MONOCYTES # BLD AUTO: 0.96 X10*3/UL (ref 0.1–1)
MONOCYTES NFR BLD AUTO: 8.7 %
NEUTROPHILS # BLD AUTO: 6.99 X10*3/UL (ref 1.2–7.7)
NEUTROPHILS NFR BLD AUTO: 63.4 %
NITRITE UR QL STRIP.AUTO: NEGATIVE
NRBC BLD-RTO: 0 /100 WBCS (ref 0–0)
PH UR STRIP.AUTO: 6.5 [PH]
PLATELET # BLD AUTO: 332 X10*3/UL (ref 150–450)
POTASSIUM SERPL-SCNC: 4.3 MMOL/L (ref 3.5–5.3)
PREGNANCY TEST URINE, POC: POSITIVE
PROT SERPL-MCNC: 7.5 G/DL (ref 6.4–8.2)
PROT UR STRIP.AUTO-MCNC: NEGATIVE MG/DL
RBC # BLD AUTO: 3.89 X10*6/UL (ref 4–5.2)
RBC # UR STRIP.AUTO: NEGATIVE /UL
SODIUM SERPL-SCNC: 134 MMOL/L (ref 136–145)
SP GR UR STRIP.AUTO: 1.02
UROBILINOGEN UR STRIP.AUTO-MCNC: ABNORMAL MG/DL
WBC # BLD AUTO: 11 X10*3/UL (ref 4.4–11.3)

## 2025-01-11 PROCEDURE — 81025 URINE PREGNANCY TEST: CPT

## 2025-01-11 PROCEDURE — 81003 URINALYSIS AUTO W/O SCOPE: CPT

## 2025-01-11 PROCEDURE — 85025 COMPLETE CBC W/AUTO DIFF WBC: CPT

## 2025-01-11 PROCEDURE — 84075 ASSAY ALKALINE PHOSPHATASE: CPT

## 2025-01-11 PROCEDURE — 83690 ASSAY OF LIPASE: CPT

## 2025-01-11 PROCEDURE — 84702 CHORIONIC GONADOTROPIN TEST: CPT

## 2025-01-11 PROCEDURE — 2500000001 HC RX 250 WO HCPCS SELF ADMINISTERED DRUGS (ALT 637 FOR MEDICARE OP): Mod: SE

## 2025-01-11 PROCEDURE — 36415 COLL VENOUS BLD VENIPUNCTURE: CPT

## 2025-01-11 PROCEDURE — 80053 COMPREHEN METABOLIC PANEL: CPT

## 2025-01-11 RX ORDER — FAMOTIDINE 20 MG/1
20 TABLET, FILM COATED ORAL 2 TIMES DAILY PRN
Qty: 30 TABLET | Refills: 0 | Status: SHIPPED | OUTPATIENT
Start: 2025-01-11 | End: 2025-01-26

## 2025-01-11 RX ORDER — ACETAMINOPHEN 500 MG
500 TABLET ORAL EVERY 6 HOURS PRN
Qty: 28 TABLET | Refills: 0 | Status: SHIPPED | OUTPATIENT
Start: 2025-01-11 | End: 2025-01-18

## 2025-01-11 RX ORDER — ACETAMINOPHEN 325 MG/1
975 TABLET ORAL ONCE
Status: COMPLETED | OUTPATIENT
Start: 2025-01-11 | End: 2025-01-11

## 2025-01-11 RX ORDER — FAMOTIDINE 40 MG/1
40 TABLET, FILM COATED ORAL ONCE
Status: COMPLETED | OUTPATIENT
Start: 2025-01-11 | End: 2025-01-11

## 2025-01-11 RX ADMIN — FAMOTIDINE 40 MG: 40 TABLET ORAL at 01:36

## 2025-01-11 RX ADMIN — ACETAMINOPHEN 975 MG: 325 TABLET ORAL at 01:36

## 2025-01-11 NOTE — ED PROVIDER NOTES
HPI   Chief Complaint   Patient presents with    Abdominal Pain   This is a 24-year-old female who is 7 weeks OB, G:3 P:2, who presents to the ED with abdominal pain.  Patient states that earlier this evening at approximately 10:45 PM, she developed cramping pain in the left side of her abdomen.  She states that she was in her car going to  her fiancé when her pain initially began.  She also picked up food at [employer] and after she was finished eating, her symptoms worsened and the pain moved into her lower abdomen.  She states that by the time she arrived in the ED, her pain had resolved and she is currently asymptomatic.   She did not take any OTC medication.  She denies any nausea, vomiting or diarrhea.  Denies urinary frequency, urgency dysuria or hematuria.  Denies any vaginal bleeding or discharge.  Patient reports that she follows with OB and had an ultrasound performed yesterday.    Denies fevers, chills, headache, dizziness, cough, chest pain or shortness of breath    Limitations to history: None   Independent Historians: Patient  External Records Reviewed: None    Patient History   Past Medical History:   Diagnosis Date    Other specified health status 2021    No pertinent past medical history     Past Surgical History:   Procedure Laterality Date    OTHER SURGICAL HISTORY  2021     section     Family History   Problem Relation Name Age of Onset    Breast cancer Paternal Grandmother      Breast cancer Maternal Great-Grandmother      Brain cancer Maternal Great-Grandmother       Social History     Tobacco Use    Smoking status: Never    Smokeless tobacco: Never   Vaping Use    Vaping status: Never Used   Substance Use Topics    Alcohol use: Never    Drug use: Never       Physical Exam   ED Triage Vitals [25 0009]   Temperature Heart Rate Respirations BP   36.5 °C (97.7 °F) 94 18 126/84      Pulse Ox Temp Source Heart Rate Source Patient Position   99 % Temporal Monitor --       BP Location FiO2 (%)     -- --       Physical Exam  Constitutional:       General: She is not in acute distress.     Appearance: She is not ill-appearing or diaphoretic.   HENT:      Head: Normocephalic and atraumatic.   Cardiovascular:      Rate and Rhythm: Normal rate and regular rhythm.      Heart sounds: Normal heart sounds. No murmur heard.     No friction rub. No gallop.   Pulmonary:      Effort: Pulmonary effort is normal. No respiratory distress.      Breath sounds: Normal breath sounds. No wheezing, rhonchi or rales.   Chest:      Chest wall: No tenderness.   Abdominal:      General: Bowel sounds are normal. There is no distension.      Palpations: Abdomen is soft.      Tenderness: There is no abdominal tenderness. There is no right CVA tenderness, left CVA tenderness, guarding or rebound.   Musculoskeletal:         General: No deformity or signs of injury.      Cervical back: Normal range of motion and neck supple.   Skin:     General: Skin is warm and dry.      Coloration: Skin is not jaundiced or pale.   Neurological:      General: No focal deficit present.      Mental Status: She is alert and oriented to person, place, and time.       ED Course & MDM   Diagnoses as of 01/11/25 0302   Abdominal pain affecting pregnancy ([facility])       Medical Decision Making  This is a 24-year-old female who is 7 weeks OB, G:3 P:2, who presents to the ED with abdominal pain.  Patient states that earlier this evening at approximately 10:45 PM, she developed cramping pain in the left side of her abdomen that is now moved to her lower abdomen    On physical exam, patient is overall well-appearing and in no acute distress.  No active vomiting or retching.  Abdomen is soft, nondistended and nontender with normal bowel sounds. There is no guarding, rebound or evidence of peritonitis.  No CVA tenderness.  Vitals are stable.    Administered Tylenol and Pepcid.  CBC does not show any anemia or leukocytosis.  CMP shows normal  renal and hepatic functions without electrolyte disturbances.  Lipase is normal, low suspicion for acute pancreatitis.  Urinalysis is nonconcerning for infection, no nitrites, pyuria or leukocyte esterase. Patient had an ultrasound performed yesterday 1/10/25, which showed a single live IUP corresponding to 7 weeks, 3 days and unremarkable adnexa.  She denies any vaginal bleeding or current complaints of pain, there is no indication for repeat imaging.    On reassessment, patient states that she is still pain-free.  Discussed results of ED findings and counseled patient to follow-up with her OB.  She reports that her next appointment is scheduled for 1/21/2025.  She was advised to return to ED with any new or worsening symptoms.  Patient verbalized understanding was agreeable to plan of care.  Discharged in stable condition      Labs Reviewed   CBC WITH AUTO DIFFERENTIAL - Abnormal       Result Value    WBC 11.0      nRBC 0.0      RBC 3.89 (*)     Hemoglobin 11.1 (*)     Hematocrit 30.7 (*)     MCV 79 (*)     MCH 28.5      MCHC 36.2 (*)     RDW 12.5      Platelets 332      Neutrophils % 63.4      Immature Granulocytes %, Automated 0.2      Lymphocytes % 26.4      Monocytes % 8.7      Eosinophils % 0.8      Basophils % 0.5      Neutrophils Absolute 6.99      Immature Granulocytes Absolute, Automated 0.02      Lymphocytes Absolute 2.91      Monocytes Absolute 0.96      Eosinophils Absolute 0.09      Basophils Absolute 0.05     COMPREHENSIVE METABOLIC PANEL - Abnormal    Glucose 92      Sodium 134 (*)     Potassium 4.3      Chloride 100      Bicarbonate 26      Anion Gap 12      Urea Nitrogen 7      Creatinine 0.65      eGFR >90      Calcium 9.5      Albumin 4.5      Alkaline Phosphatase 64      Total Protein 7.5      AST 12      Bilirubin, Total 0.3      ALT 7     URINALYSIS WITH REFLEX CULTURE AND MICROSCOPIC - Abnormal    Color, Urine Light-Yellow      Appearance, Urine Turbid (*)     Specific Gravity, Urine 1.016       pH, Urine 6.5      Protein, Urine NEGATIVE      Glucose, Urine Normal      Blood, Urine NEGATIVE      Ketones, Urine NEGATIVE      Bilirubin, Urine NEGATIVE      Urobilinogen, Urine 2 (1+) (*)     Nitrite, Urine NEGATIVE      Leukocyte Esterase, Urine NEGATIVE     HUMAN CHORIONIC GONADOTROPIN, SERUM QUANTITATIVE - Abnormal    HCG, Beta-Quantitative 68,000 (*)     Narrative:     Total HCG measurement is performed using the [employer] immunoassay which detects intact HCG and free beta HCG subunit.  This test is not indicated for use as a tumor marker.  HCG testing is performed using a different test methodology at [facility] than other [facility]. Direct result comparison  should only be made within the same method.          POCT PREGNANCY, URINE - Abnormal    Preg Test, Ur Positive (*)    LIPASE - Normal    Lipase 23      Narrative:     Venipuncture immediately after or during the administration of Metamizole may lead to falsely low results. Testing should be performed immediately prior to Metamizole dosing.   URINALYSIS WITH REFLEX CULTURE AND MICROSCOPIC    Narrative:     The following orders were created for panel order Urinalysis with Reflex Culture and Microscopic.  Procedure                               Abnormality         Status                     ---------                               -----------         ------                     Urinalysis with Reflex C...[[id number]]  Abnormal            Final result               Extra Urine Gray Tube[[id number]]                            In process                   Please view results for these tests on the individual orders.   EXTRA URINE GRAY TUBE               Mayra Rhodes PA-C  01/11/25 0339

## 2025-01-11 NOTE — TELEPHONE ENCOUNTER
"24 year old  at 7.1 weeks by LMP calling answering service for severe left sided abdominal pain that began \"a few minutes ago\".  She was driving to her fiance's job to pick him up, and she initially noticed the abdominal pain.  She thought she was hungry, so she stopped by [employer] to get a little something before she went home to eat dinner.  After she ate, she noticed the left sided abdominal pain getting worse and it \"shot up to her chest, causing me to feel like I was about to have a panic attack.\"  She is currently rating the pain a 7/10, she cannot describe the consistency of the pain.  Denies vaginal bleeding.    She sounds tearful and short of breath while speaking on the phone.    Due to the severity of the abdominal pain, the increase in pain after eating and the breathless sound to her voice, recommended patient present to the ED for further evaluation; patient verbalized understanding and agreed with plan.    Dr. Tyler aware of patient and POC; agrees with POC.    Radha Eid, YAW-ALLY    "

## 2025-01-13 RX ORDER — PYRIDOXINE HCL (VITAMIN B6) 25 MG
25 TABLET ORAL EVERY 8 HOURS
Qty: 90 TABLET | Refills: 1 | Status: SHIPPED | OUTPATIENT
Start: 2025-01-13 | End: 2025-04-13

## 2025-01-14 LAB
HEMOGLOBIN A2: 3.8 % (ref 2–3.5)
HEMOGLOBIN A: 58.5 % (ref 95.8–98)
HEMOGLOBIN F: 0.4 % (ref 0–2)
HEMOGLOBIN IDENTIFICATION INTERPRETATION: ABNORMAL
HEMOGLOBIN S: 37.3 %
PATH REVIEW-HGB IDENTIFICATION: ABNORMAL

## 2025-01-15 PROBLEM — D57.3 SICKLE CELL TRAIT (CMS-HCC): Status: ACTIVE | Noted: 2025-01-15

## 2025-01-16 ENCOUNTER — APPOINTMENT (OUTPATIENT)
Dept: PRIMARY CARE | Facility: CLINIC | Age: 25
End: 2025-01-16
Payer: COMMERCIAL

## 2025-01-21 ENCOUNTER — ROUTINE PRENATAL (OUTPATIENT)
Dept: OBSTETRICS AND GYNECOLOGY | Facility: HOSPITAL | Age: 25
End: 2025-01-21
Payer: COMMERCIAL

## 2025-01-21 VITALS — WEIGHT: 155 LBS | DIASTOLIC BLOOD PRESSURE: 77 MMHG | BODY MASS INDEX: 26.61 KG/M2 | SYSTOLIC BLOOD PRESSURE: 121 MMHG

## 2025-01-21 DIAGNOSIS — O34.219 UTERINE SCAR FROM PREVIOUS CESAREAN DELIVERY AFFECTING PREGNANCY (HHS-HCC): ICD-10-CM

## 2025-01-21 DIAGNOSIS — Z3A.08 8 WEEKS GESTATION OF PREGNANCY (HHS-HCC): Primary | ICD-10-CM

## 2025-01-21 DIAGNOSIS — O21.9 NAUSEA AND VOMITING DURING PREGNANCY PRIOR TO 22 WEEKS GESTATION: ICD-10-CM

## 2025-01-21 DIAGNOSIS — D57.3 SICKLE CELL TRAIT (CMS-HCC): ICD-10-CM

## 2025-01-21 DIAGNOSIS — Z87.74 HISTORY OF CONGENITAL HEART DEFECT: ICD-10-CM

## 2025-01-21 PROCEDURE — 99213 OFFICE O/P EST LOW 20 MIN: CPT | Mod: TH

## 2025-01-21 PROCEDURE — 99213 OFFICE O/P EST LOW 20 MIN: CPT

## 2025-01-21 RX ORDER — ONDANSETRON 4 MG/1
4 TABLET, ORALLY DISINTEGRATING ORAL EVERY 8 HOURS PRN
Qty: 180 TABLET | Refills: 0 | Status: SHIPPED | OUTPATIENT
Start: 2025-01-21 | End: 2025-03-22

## 2025-01-21 NOTE — PROGRESS NOTES
Ob Visit  25     SUBJECTIVE      HPI: Fatou Zambrano is a 24 y.o.  at 8w5d here for RPNV.  She has no contractions, no bleeding, or no LOF. Patient reports continuous nausea; Vitamin B6 is not assisting.       OBJECTIVE  Visit Vitals  /77   Wt 70.3 kg (155 lb)   LMP 2024   BMI 26.61 kg/m²   OB Status Pregnant   Smoking Status Never   BSA 1.78 m²            ASSESSMENT & PLAN    Fatou Zambrano is a 24 y.o.  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         8 weeks gestation of pregnancy ([facility]) - Primary    Overview     Desired provider in labor: [] CNM  [] Physician  [x] Blood Products: [x] Yes, accepts [] No, needs counseling  [x] Initial BMI: 26.20  [] Prenatal Labs:   [x] Cervical Cancer Screening up to date due PP  [] Rh status:   [] Genetic Screening:    [] NT US: (11-13 wks)  [] Baby ASA (if indicated):  [] Pregnancy dated by:     [] Anatomy US: (19-20 wks)  [] Federal Sterilization consent signed (if indicated):  [] 1hr GCT at 24-28wks:  [] Rhogam (if indicated):   [] Fetal Surveillance (if indicated):  [] Tdap (27-32 wks, may be given up to 36 wks if initial window missed):   [] RSV (32-36 wks) (Sept. to end of ):   [] Flu Vaccine:    [] Breastfeeding:  [x] Postpartum Birth control method: PPTL  [] GBS at 36 - 37 wks:  [] 39 weeks discussion of IOL vs. Expectant management:  [] Mode of delivery ( anticipated ):           Sickle cell trait (CMS-HCC)    Nausea and vomiting during pregnancy prior to 22 weeks gestation    Relevant Medications    ondansetron ODT (Zofran-ODT) 4 mg disintegrating tablet         RTC in 4 weeks      YAW Hills-ALLY

## 2025-02-14 ENCOUNTER — HOSPITAL ENCOUNTER (OUTPATIENT)
Dept: RADIOLOGY | Facility: HOSPITAL | Age: 25
Discharge: HOME | End: 2025-02-14
Payer: COMMERCIAL

## 2025-02-14 ENCOUNTER — ROUTINE PRENATAL (OUTPATIENT)
Dept: OBSTETRICS AND GYNECOLOGY | Facility: HOSPITAL | Age: 25
End: 2025-02-14
Payer: COMMERCIAL

## 2025-02-14 ENCOUNTER — LAB (OUTPATIENT)
Dept: LAB | Facility: HOSPITAL | Age: 25
End: 2025-02-14
Payer: COMMERCIAL

## 2025-02-14 VITALS — BODY MASS INDEX: 26.95 KG/M2 | SYSTOLIC BLOOD PRESSURE: 103 MMHG | WEIGHT: 157 LBS | DIASTOLIC BLOOD PRESSURE: 68 MMHG

## 2025-02-14 DIAGNOSIS — Z3A.01 LESS THAN 8 WEEKS GESTATION OF PREGNANCY (HHS-HCC): ICD-10-CM

## 2025-02-14 DIAGNOSIS — Z3A.08 8 WEEKS GESTATION OF PREGNANCY (HHS-HCC): ICD-10-CM

## 2025-02-14 DIAGNOSIS — K59.00 CONSTIPATION, UNSPECIFIED CONSTIPATION TYPE: Primary | ICD-10-CM

## 2025-02-14 DIAGNOSIS — Z3A.12 12 WEEKS GESTATION OF PREGNANCY (HHS-HCC): Primary | ICD-10-CM

## 2025-02-14 DIAGNOSIS — Z3A.12 12 WEEKS GESTATION OF PREGNANCY (HHS-HCC): ICD-10-CM

## 2025-02-14 PROCEDURE — 99214 OFFICE O/P EST MOD 30 MIN: CPT | Performed by: NURSE PRACTITIONER

## 2025-02-14 PROCEDURE — 99214 OFFICE O/P EST MOD 30 MIN: CPT | Mod: TH | Performed by: NURSE PRACTITIONER

## 2025-02-14 PROCEDURE — 76813 OB US NUCHAL MEAS 1 GEST: CPT

## 2025-02-14 RX ORDER — DOCUSATE SODIUM 100 MG/1
100 CAPSULE, LIQUID FILLED ORAL 2 TIMES DAILY PRN
Qty: 90 CAPSULE | Refills: 2 | Status: SHIPPED | OUTPATIENT
Start: 2025-02-14 | End: 2026-02-14

## 2025-02-14 RX ORDER — NAPROXEN SODIUM 220 MG/1
81 TABLET, FILM COATED ORAL 2 TIMES DAILY
Qty: 180 TABLET | Refills: 3 | Status: SHIPPED | OUTPATIENT
Start: 2025-02-14 | End: 2026-02-14

## 2025-02-14 NOTE — PROGRESS NOTES
Ob Visit  25     SUBJECTIVE      HPI: Fatou Zambrano is a 24 y.o.  at 12w1d here for RPNV.  She has 0 contractions,  bleeding, or LOF.  She is experiencing constipation and feels she would not be able to use a powder mixed in liquid.  Colace prescription sent to the pharmacy.  Encouraged to improve fiber intake once she is able to eat food more readily.    Genetic testing ordered today.    OBJECTIVE  Visit Vitals  /68   Wt 71.2 kg (157 lb)   LMP 2024   BMI 26.95 kg/m²   OB Status Pregnant   Smoking Status Never   BSA 1.79 m²            ASSESSMENT & PLAN    Fatou Zambrano is a 24 y.o.  at 12w1d here for the following concerns we addressed today:    Problem List Items Addressed This Visit          Ob-Gyn Problems    12 weeks gestation of pregnancy ([facility])    Overview     Desired provider in labor: [] CNM  [] Physician  [x] Blood Products: [x] Yes, accepts [] No, needs counseling  [x] Initial BMI: 26.20  [] Prenatal Labs:   [x] Cervical Cancer Screening up to date due PP  [] Rh status:   [x] Genetic Screening:    [x] NT US: (11-13 wks)  [x] Baby ASA (if indicated):  [] Pregnancy dated by:     [] Anatomy US: (19-20 wks)  [] Federal Sterilization consent signed (if indicated):  [] 1hr GCT at 24-28wks:  [] Rhogam (if indicated):   [] Fetal Surveillance (if indicated):  [] Tdap (27-32 wks, may be given up to 36 wks if initial window missed):   [] RSV (32-36 wks) (Sept. to end of ):   [] Flu Vaccine:    [] Breastfeeding:  [x] Postpartum Birth control method: PPTL  [] GBS at 36 - 37 wks:  [] 39 weeks discussion of IOL vs. Expectant management:  [] Mode of delivery ( anticipated ):           Relevant Medications    docusate sodium (Colace) 100 mg capsule    aspirin 81 mg chewable tablet    Other Relevant Orders    [facility] Prenatal Screen     Other Visit Diagnoses       Constipation, unspecified constipation type    -  Primary              RTC in 4 weeks      Brianda Carpio  YAW Mathew-CNP

## 2025-02-20 ENCOUNTER — TELEPHONE (OUTPATIENT)
Dept: OBSTETRICS AND GYNECOLOGY | Facility: HOSPITAL | Age: 25
End: 2025-02-20
Payer: COMMERCIAL

## 2025-02-24 ENCOUNTER — TELEPHONE (OUTPATIENT)
Dept: OBSTETRICS AND GYNECOLOGY | Facility: HOSPITAL | Age: 25
End: 2025-02-24
Payer: COMMERCIAL

## 2025-02-24 NOTE — TELEPHONE ENCOUNTER
RN returned pt call. Left message to call the office and let her know labs from Friday have not resulted yet.  DENISE Loya

## 2025-02-27 NOTE — TELEPHONE ENCOUNTER
RN returned pt call. Pt name and  verified. Pt questioned by she was prescribed low dose ASA. Pt informed it used as a prophylactic to help prevent pre-E and htn disorders in pregnancy. The dose is normally 81mg daily. The order sent for the pt is 81mg BID. Forwarding to Brianda to validate dosage and advise the pt. Pt also informed her genetic testing is still in process. Pt verbalized understanding. All questions and concerns addressed.  DENISE Loya

## 2025-02-27 NOTE — TELEPHONE ENCOUNTER
RN called pt back. Pt name and  verified. RN discussed dosage of ASA with Brianda Mathew CNP. Pt informed she is to take the 81mg ASA BID. Pt verbalized understanding. All questions and concerns addressed.  DENISE Loya

## 2025-03-01 LAB
COMMENTS - MP RESULT TYPE: NORMAL
SCAN RESULT: NORMAL

## 2025-03-14 ENCOUNTER — ROUTINE PRENATAL (OUTPATIENT)
Dept: OBSTETRICS AND GYNECOLOGY | Facility: HOSPITAL | Age: 25
End: 2025-03-14
Payer: COMMERCIAL

## 2025-03-14 VITALS — BODY MASS INDEX: 26.95 KG/M2 | WEIGHT: 157 LBS | DIASTOLIC BLOOD PRESSURE: 68 MMHG | SYSTOLIC BLOOD PRESSURE: 103 MMHG

## 2025-03-14 DIAGNOSIS — Z3A.16 16 WEEKS GESTATION OF PREGNANCY (HHS-HCC): Primary | ICD-10-CM

## 2025-03-14 DIAGNOSIS — Z3A.12 12 WEEKS GESTATION OF PREGNANCY (HHS-HCC): ICD-10-CM

## 2025-03-14 PROCEDURE — 99214 OFFICE O/P EST MOD 30 MIN: CPT | Mod: TH | Performed by: NURSE PRACTITIONER

## 2025-03-14 NOTE — PROGRESS NOTES
Ob Visit  25     SUBJECTIVE      HPI: Fatou Zambrano is a 24 y.o.  at 16w1d here for RPNV.       OBJECTIVE  Visit Vitals  /68   Wt 71.2 kg (157 lb)   LMP 2024   BMI 26.95 kg/m²   OB Status Pregnant   Smoking Status Never   BSA 1.79 m²            ASSESSMENT & PLAN    Fatou Zambrano is a 24 y.o.  at 16w1d here for the following concerns we addressed today:    Problem List Items Addressed This Visit          Ob-Gyn Problems    16 weeks gestation of pregnancy ([facility]) - Primary    Overview     Desired provider in labor: [] CNM  [] Physician  [x] Blood Products: [x] Yes, accepts [] No, needs counseling  [x] Initial BMI: 26.20  [x] Prenatal Labs:   [x] Cervical Cancer Screening up to date due PP  [] Rh status: Rh+  [x] Genetic Screening:   RR cf DNA male  [x] NT US: (11-13 wks)  [x] Baby ASA (if indicated):  [] Pregnancy dated by:     [] Anatomy US: (19-20 wks) scheduled  [] Federal Sterilization consent signed (if indicated):  [] 1hr GCT at 24-28wks:  [] Rhogam (if indicated): Not applicable  [] Fetal Surveillance (if indicated):  [] Tdap (27-32 wks, may be given up to 36 wks if initial window missed):   [] RSV (32-36 wks) (Sept. to end of ):   [] Flu Vaccine:    [] Breastfeeding:  [x] Postpartum Birth control method: PPTL  [] GBS at 36 - 37 wks:  [] 39 weeks discussion of IOL vs. Expectant management:  [] Mode of delivery ( anticipated ):                RTC in 4 weeks      Brianda Mathew, APRN-CNP

## 2025-04-04 ENCOUNTER — APPOINTMENT (OUTPATIENT)
Dept: RADIOLOGY | Facility: HOSPITAL | Age: 25
End: 2025-04-04
Payer: COMMERCIAL

## 2025-04-05 ENCOUNTER — HOSPITAL ENCOUNTER (OUTPATIENT)
Facility: HOSPITAL | Age: 25
Discharge: HOME | End: 2025-04-06
Attending: STUDENT IN AN ORGANIZED HEALTH CARE EDUCATION/TRAINING PROGRAM | Admitting: STUDENT IN AN ORGANIZED HEALTH CARE EDUCATION/TRAINING PROGRAM
Payer: COMMERCIAL

## 2025-04-05 ENCOUNTER — TELEPHONE (OUTPATIENT)
Dept: OBSTETRICS AND GYNECOLOGY | Facility: HOSPITAL | Age: 25
End: 2025-04-05
Payer: COMMERCIAL

## 2025-04-05 LAB
ALBUMIN SERPL BCP-MCNC: 3.7 G/DL (ref 3.4–5)
ALP SERPL-CCNC: 67 U/L (ref 33–110)
ALT SERPL W P-5'-P-CCNC: 5 U/L (ref 7–45)
ANION GAP SERPL CALC-SCNC: 13 MMOL/L (ref 10–20)
AST SERPL W P-5'-P-CCNC: 9 U/L (ref 9–39)
BILIRUB SERPL-MCNC: 0.2 MG/DL (ref 0–1.2)
BILIRUBIN, POC: NEGATIVE
BLOOD URINE, POC: NEGATIVE
BUN SERPL-MCNC: 6 MG/DL (ref 6–23)
CALCIUM SERPL-MCNC: 9.2 MG/DL (ref 8.6–10.6)
CHLORIDE SERPL-SCNC: 104 MMOL/L (ref 98–107)
CLARITY, POC: CLEAR
CO2 SERPL-SCNC: 22 MMOL/L (ref 21–32)
COLOR, POC: NORMAL
CREAT SERPL-MCNC: 0.48 MG/DL (ref 0.5–1.05)
EGFRCR SERPLBLD CKD-EPI 2021: >90 ML/MIN/1.73M*2
ERYTHROCYTE [DISTWIDTH] IN BLOOD BY AUTOMATED COUNT: 13.2 % (ref 11.5–14.5)
GLUCOSE SERPL-MCNC: 99 MG/DL (ref 74–99)
GLUCOSE URINE, POC: NEGATIVE
HCT VFR BLD AUTO: 30.2 % (ref 36–46)
HGB BLD-MCNC: 10 G/DL (ref 12–16)
KETONES, POC: NEGATIVE
LEUKOCYTE EST, POC: NORMAL
MCH RBC QN AUTO: 29 PG (ref 26–34)
MCHC RBC AUTO-ENTMCNC: 33.1 G/DL (ref 32–36)
MCV RBC AUTO: 88 FL (ref 80–100)
NITRITE, POC: NEGATIVE
NRBC BLD-RTO: 0 /100 WBCS (ref 0–0)
PH, POC: 6.5
PLATELET # BLD AUTO: 268 X10*3/UL (ref 150–450)
POC APPEARANCE OF BODY FLUID: NORMAL
POTASSIUM SERPL-SCNC: 4.1 MMOL/L (ref 3.5–5.3)
PROT SERPL-MCNC: 6.3 G/DL (ref 6.4–8.2)
RBC # BLD AUTO: 3.45 X10*6/UL (ref 4–5.2)
SODIUM SERPL-SCNC: 135 MMOL/L (ref 136–145)
SPECIFIC GRAVITY, POC: 1.01
URINE PROTEIN, POC: NEGATIVE
UROBILINOGEN, POC: 1
WBC # BLD AUTO: 11.6 X10*3/UL (ref 4.4–11.3)

## 2025-04-05 PROCEDURE — 36415 COLL VENOUS BLD VENIPUNCTURE: CPT

## 2025-04-05 PROCEDURE — 80053 COMPREHEN METABOLIC PANEL: CPT

## 2025-04-05 PROCEDURE — 99213 OFFICE O/P EST LOW 20 MIN: CPT

## 2025-04-05 PROCEDURE — 2500000001 HC RX 250 WO HCPCS SELF ADMINISTERED DRUGS (ALT 637 FOR MEDICARE OP): Mod: SE

## 2025-04-05 PROCEDURE — 86901 BLOOD TYPING SEROLOGIC RH(D): CPT

## 2025-04-05 PROCEDURE — 86900 BLOOD TYPING SEROLOGIC ABO: CPT

## 2025-04-05 PROCEDURE — 85027 COMPLETE CBC AUTOMATED: CPT

## 2025-04-05 RX ORDER — LABETALOL HYDROCHLORIDE 5 MG/ML
20 INJECTION, SOLUTION INTRAVENOUS ONCE AS NEEDED
Status: DISCONTINUED | OUTPATIENT
Start: 2025-04-05 | End: 2025-04-06 | Stop reason: HOSPADM

## 2025-04-05 RX ORDER — HYDRALAZINE HYDROCHLORIDE 20 MG/ML
5 INJECTION INTRAMUSCULAR; INTRAVENOUS ONCE AS NEEDED
Status: DISCONTINUED | OUTPATIENT
Start: 2025-04-05 | End: 2025-04-06 | Stop reason: HOSPADM

## 2025-04-05 RX ORDER — ONDANSETRON HYDROCHLORIDE 2 MG/ML
4 INJECTION, SOLUTION INTRAVENOUS EVERY 6 HOURS PRN
Status: DISCONTINUED | OUTPATIENT
Start: 2025-04-05 | End: 2025-04-06 | Stop reason: HOSPADM

## 2025-04-05 RX ORDER — LIDOCAINE HYDROCHLORIDE 10 MG/ML
0.5 INJECTION, SOLUTION INFILTRATION; PERINEURAL ONCE AS NEEDED
Status: DISCONTINUED | OUTPATIENT
Start: 2025-04-05 | End: 2025-04-06 | Stop reason: HOSPADM

## 2025-04-05 RX ORDER — ONDANSETRON 4 MG/1
4 TABLET, FILM COATED ORAL EVERY 6 HOURS PRN
Status: DISCONTINUED | OUTPATIENT
Start: 2025-04-05 | End: 2025-04-06 | Stop reason: HOSPADM

## 2025-04-05 RX ORDER — ACETAMINOPHEN 325 MG/1
975 TABLET ORAL ONCE
Status: COMPLETED | OUTPATIENT
Start: 2025-04-05 | End: 2025-04-05

## 2025-04-05 RX ADMIN — ACETAMINOPHEN 975 MG: 325 TABLET, FILM COATED ORAL at 22:34

## 2025-04-05 SDOH — ECONOMIC STABILITY: FOOD INSECURITY: WITHIN THE PAST 12 MONTHS, YOU WORRIED THAT YOUR FOOD WOULD RUN OUT BEFORE YOU GOT THE MONEY TO BUY MORE.: NEVER TRUE

## 2025-04-05 SDOH — HEALTH STABILITY: MENTAL HEALTH: WISH TO BE DEAD (PAST 1 MONTH): NO

## 2025-04-05 SDOH — SOCIAL STABILITY: SOCIAL INSECURITY: WITHIN THE LAST YEAR, HAVE YOU BEEN HUMILIATED OR EMOTIONALLY ABUSED IN OTHER WAYS BY YOUR PARTNER OR EX-PARTNER?: NO

## 2025-04-05 SDOH — SOCIAL STABILITY: SOCIAL INSECURITY: DO YOU FEEL ANYONE HAS EXPLOITED OR TAKEN ADVANTAGE OF YOU FINANCIALLY OR OF YOUR PERSONAL PROPERTY?: NO

## 2025-04-05 SDOH — SOCIAL STABILITY: SOCIAL INSECURITY: HAVE YOU HAD ANY THOUGHTS OF HARMING ANYONE ELSE?: NO

## 2025-04-05 SDOH — SOCIAL STABILITY: SOCIAL INSECURITY
WITHIN THE LAST YEAR, HAVE YOU BEEN KICKED, HIT, SLAPPED, OR OTHERWISE PHYSICALLY HURT BY YOUR PARTNER OR EX-PARTNER?: NO

## 2025-04-05 SDOH — SOCIAL STABILITY: SOCIAL INSECURITY: ABUSE SCREEN: ADULT

## 2025-04-05 SDOH — ECONOMIC STABILITY: FOOD INSECURITY: WITHIN THE PAST 12 MONTHS, THE FOOD YOU BOUGHT JUST DIDN'T LAST AND YOU DIDN'T HAVE MONEY TO GET MORE.: NEVER TRUE

## 2025-04-05 SDOH — SOCIAL STABILITY: SOCIAL INSECURITY: HAVE YOU HAD THOUGHTS OF HARMING ANYONE ELSE?: NO

## 2025-04-05 SDOH — SOCIAL STABILITY: SOCIAL INSECURITY: ARE THERE ANY APPARENT SIGNS OF INJURIES/BEHAVIORS THAT COULD BE RELATED TO ABUSE/NEGLECT?: NO

## 2025-04-05 SDOH — HEALTH STABILITY: MENTAL HEALTH: NON-SPECIFIC ACTIVE SUICIDAL THOUGHTS (PAST 1 MONTH): NO

## 2025-04-05 SDOH — SOCIAL STABILITY: SOCIAL INSECURITY: PHYSICAL ABUSE: DENIES

## 2025-04-05 SDOH — SOCIAL STABILITY: SOCIAL INSECURITY: DOES ANYONE TRY TO KEEP YOU FROM HAVING/CONTACTING OTHER FRIENDS OR DOING THINGS OUTSIDE YOUR HOME?: NO

## 2025-04-05 SDOH — SOCIAL STABILITY: SOCIAL INSECURITY: VERBAL ABUSE: DENIES

## 2025-04-05 SDOH — SOCIAL STABILITY: SOCIAL INSECURITY: WITHIN THE LAST YEAR, HAVE YOU BEEN AFRAID OF YOUR PARTNER OR EX-PARTNER?: NO

## 2025-04-05 SDOH — SOCIAL STABILITY: SOCIAL INSECURITY
WITHIN THE LAST YEAR, HAVE YOU BEEN RAPED OR FORCED TO HAVE ANY KIND OF SEXUAL ACTIVITY BY YOUR PARTNER OR EX-PARTNER?: NO

## 2025-04-05 SDOH — HEALTH STABILITY: MENTAL HEALTH: SUICIDAL BEHAVIOR (LIFETIME): NO

## 2025-04-05 SDOH — ECONOMIC STABILITY: FOOD INSECURITY: HOW HARD IS IT FOR YOU TO PAY FOR THE VERY BASICS LIKE FOOD, HOUSING, MEDICAL CARE, AND HEATING?: NOT HARD AT ALL

## 2025-04-05 SDOH — HEALTH STABILITY: MENTAL HEALTH: WERE YOU ABLE TO COMPLETE ALL THE BEHAVIORAL HEALTH SCREENINGS?: YES

## 2025-04-05 SDOH — SOCIAL STABILITY: SOCIAL INSECURITY: HAS ANYONE EVER THREATENED TO HURT YOUR FAMILY OR YOUR PETS?: NO

## 2025-04-05 SDOH — ECONOMIC STABILITY: HOUSING INSECURITY: DO YOU FEEL UNSAFE GOING BACK TO THE PLACE WHERE YOU ARE LIVING?: NO

## 2025-04-05 SDOH — SOCIAL STABILITY: SOCIAL INSECURITY: ARE YOU OR HAVE YOU BEEN THREATENED OR ABUSED PHYSICALLY, EMOTIONALLY, OR SEXUALLY BY ANYONE?: NO

## 2025-04-05 SDOH — ECONOMIC STABILITY: TRANSPORTATION INSECURITY: IN THE PAST 12 MONTHS, HAS LACK OF TRANSPORTATION KEPT YOU FROM MEDICAL APPOINTMENTS OR FROM GETTING MEDICATIONS?: NO

## 2025-04-05 ASSESSMENT — LIFESTYLE VARIABLES
SKIP TO QUESTIONS 9-10: 1
HOW MANY STANDARD DRINKS CONTAINING ALCOHOL DO YOU HAVE ON A TYPICAL DAY: PATIENT DOES NOT DRINK
AUDIT-C TOTAL SCORE: 0
HOW OFTEN DO YOU HAVE A DRINK CONTAINING ALCOHOL: NEVER
HOW OFTEN DO YOU HAVE 6 OR MORE DRINKS ON ONE OCCASION: NEVER
AUDIT-C TOTAL SCORE: 0

## 2025-04-05 ASSESSMENT — PAIN SCALES - GENERAL
PAINLEVEL_OUTOF10: 9
PAINLEVEL_OUTOF10: 6
PAINLEVEL_OUTOF10: 7

## 2025-04-05 ASSESSMENT — PATIENT HEALTH QUESTIONNAIRE - PHQ9
SUM OF ALL RESPONSES TO PHQ9 QUESTIONS 1 & 2: 0
1. LITTLE INTEREST OR PLEASURE IN DOING THINGS: NOT AT ALL
2. FEELING DOWN, DEPRESSED OR HOPELESS: NOT AT ALL

## 2025-04-05 ASSESSMENT — ACTIVITIES OF DAILY LIVING (ADL): LACK_OF_TRANSPORTATION: NO

## 2025-04-05 NOTE — TELEPHONE ENCOUNTER
Patient call with sharp abdominal pain every 5 minutes, radiates from her right side to the middle.   Does feel some fetal movement. No pelvic pressure.   Pain has been persistent for 2 hours. Has not responded to rest or hydration at home.   Instructed to come to L&D for evaluation.

## 2025-04-06 ENCOUNTER — HOSPITAL ENCOUNTER (OUTPATIENT)
Facility: HOSPITAL | Age: 25
End: 2025-04-06
Attending: STUDENT IN AN ORGANIZED HEALTH CARE EDUCATION/TRAINING PROGRAM | Admitting: STUDENT IN AN ORGANIZED HEALTH CARE EDUCATION/TRAINING PROGRAM
Payer: COMMERCIAL

## 2025-04-06 VITALS
OXYGEN SATURATION: 99 % | HEART RATE: 72 BPM | WEIGHT: 161.6 LBS | SYSTOLIC BLOOD PRESSURE: 96 MMHG | RESPIRATION RATE: 16 BRPM | TEMPERATURE: 96.8 F | HEIGHT: 64 IN | DIASTOLIC BLOOD PRESSURE: 58 MMHG | BODY MASS INDEX: 27.59 KG/M2

## 2025-04-06 LAB
ABO GROUP (TYPE) IN BLOOD: NORMAL
ANTIBODY SCREEN: NORMAL
RH FACTOR (ANTIGEN D): NORMAL

## 2025-04-06 PROCEDURE — 36415 COLL VENOUS BLD VENIPUNCTURE: CPT

## 2025-04-06 PROCEDURE — 99214 OFFICE O/P EST MOD 30 MIN: CPT

## 2025-04-06 ASSESSMENT — PAIN SCALES - GENERAL
PAINLEVEL_OUTOF10: 0 - NO PAIN
PAINLEVEL_OUTOF10: 6

## 2025-04-06 NOTE — H&P
OB Triage H&P    Assessment/Plan    Fatou Zambrano is a 24 y.o.  at 19w2d, LILLIAM: 2025, by Last Menstrual Period, who presents to triage with sudden onset abdominal pain.    Differential  :Appendicitis       - Given the hx of umbilical pain progressing to right-sided abdominal pain that is sudden in onset, and McBurney's point tenderness, appendicitis is possible.         - However, the pt has been afebrile and describes the pain as pressure pain.  ::Constipation       - Waxing and waning nature, with history of constipation, taking less stool softner than prescribed during this pregnancy.         - Less likely given recent bowel movement this morning.  ::Kidney Stone       - Waxing and waning nature with pain that radiated towards the back upon deep palpation.        - However, denies hematuria, has remained afebrile.  ::UTI        - Less likely given the lack of urinary changes including dysuria, frequency, urgency, and/or hematuria.    Plan    -CBC and UA unremarkable  -US RLQ with visualization of R ureter jets ordered  -Tylenol and heat packs given with resolution of pain  -Fetal monitoring reassuring  -Good fetal movement  -Up to date on prenatal care  -Continue routine prenatal care    Dispo  -Pt desiring to leave prior to imaging. Given resolution of pain, normal vitals, and unremarkable labs, discussed that this is not unreasonable.   -Return precautions discussed   -Follow up at next scheduled OB appointment or to triage sooner as needed    Discussed plan and reviewed with: Dr. Peg Woods, PhD - MS3    Patient seen and evaluated with medical student. Agree with assessment above, edits made within text.      Miriam Villegas MD PGY-2  Obstetrics & Gynecology      Pregnancy Problems (from 24 to present)       Problem Noted Diagnosed Resolved    Nausea and vomiting during pregnancy prior to 22 weeks gestation 2025 by Radha Eid, YAW-ALLY  No    Sickle cell  trait (CMS-HCC) 1/15/2025 by JAYLYN Hills  No    16 weeks gestation of pregnancy ([facility]) 12/24/2024 by JAYLYN Hills  No    Overview Addendum 3/14/2025  4:52 PM by KEVYN Beckford     Desired provider in labor: [] CNM  [] Physician  [x] Blood Products: [x] Yes, accepts [] No, needs counseling  [x] Initial BMI: 26.20  [x] Prenatal Labs:   [x] Cervical Cancer Screening up to date due PP  [] Rh status: Rh+  [x] Genetic Screening:   RR cf DNA male  [x] NT US: (11-13 wks)  [x] Baby ASA (if indicated):  [] Pregnancy dated by:     [] Anatomy US: (19-20 wks) scheduled  [] Federal Sterilization consent signed (if indicated):  [] 1hr GCT at 24-28wks:  [] Rhogam (if indicated): Not applicable  [] Fetal Surveillance (if indicated):  [] Tdap (27-32 wks, may be given up to 36 wks if initial window missed):   [] RSV (32-36 wks) (Sept. to end of Jan):   [] Flu Vaccine:    [] Breastfeeding:  [x] Postpartum Birth control method: PPTL  [] GBS at 36 - 37 wks:  [] 39 weeks discussion of IOL vs. Expectant management:  [] Mode of delivery ( anticipated ):           History of congenital heart defect 10/14/2023 by Jolie Pineda  No    Overview Signed 12/24/2024 12:37 PM by JAYLYN Hills     aneurysmal primum atrial septum of second child                 Subjective   Good fetal movement.  Denies vaginal bleeding., Denies contractions., Denies leaking of fluid.      Starting around 5:30pm this evening, pt began to have 7/10 pressure pain in the umbilical region that slowly shifted to the RUQ and RLQ. She can not identify any aggravating factors including pain with eating or positional pain. She has not tried any alleviating factors. She states that she has had similar pains outside of pregnancy that usually self-resolve. She was prescribed Colace twice daily, but takes it only once daily. Last BM was this morning, and she usually has a soft BM every other day. She also  endorses numbness in her feet without swelling or erythema. She denies N/V, fevers, dysuria, hematuria, frequency, urgency, diarrhea, headaches, changes to vision, SOB, or chest pain.    Prenatal Provider: YAW Beckford-CNP    OB History    Para Term  AB Living   4 2 2 0 1 2   SAB IAB Ectopic Multiple Live Births   1 0 0 0 2      # Outcome Date GA Lbr Waylon/2nd Weight Sex Type Anes PTL Lv   4 Current            3 Term 23    M CS-LTranv EPI N AB   2 SAB 22           1 Term 21   2.863 kg F CS-LTranv EPI N AB       Past Surgical History:   Procedure Laterality Date    OTHER SURGICAL HISTORY  2021     section       Social History     Tobacco Use    Smoking status: Never    Smokeless tobacco: Never   Substance Use Topics    Alcohol use: Never       Allergies   Allergen Reactions    Amoxicillin Unknown    Other Unknown     Penicillins  Cross Reactors      Penicillin Unknown    Tomato Itching     The patient states her tongue and tonsils itch after eating Tomatoes.    Penicillins Rash       Facility-Administered Medications Prior to Admission   Medication Dose Route Frequency Provider Last Rate Last Admin    medroxyPROGESTERone (Depo-Provera) injection 150 mg  150 mg intramuscular Once Radha Eid, YAW-CNM         Medications Prior to Admission   Medication Sig Dispense Refill Last Dose/Taking    aspirin 81 mg chewable tablet Chew 1 tablet (81 mg) 2 times a day. 180 tablet 3     docusate sodium (Colace) 100 mg capsule Take 1 capsule (100 mg) by mouth 2 times a day as needed for constipation. 90 capsule 2     doxylamine (Unisom, doxylamine,) 25 mg tablet Take 1 tablet (25 mg) by mouth as needed at bedtime for sleep. 30 tablet 0     famotidine (Pepcid) 20 mg tablet Take 1 tablet (20 mg) by mouth 2 times a day as needed for heartburn or indigestion for up to 15 days. 30 tablet 0     ferrous sulfate, 325 mg ferrous sulfate, tablet Take 1 tablet (325 mg) by mouth  once daily with breakfast. 90 tablet 3     prenatal vitamin, iron-folic, 27 mg iron-800 mcg folic acid tablet Take 1 tablet by mouth once daily. 90 tablet 3      Objective     Last Vitals  Temp Pulse Resp BP MAP O2 Sat   36.3 °C (97.3 °F) 83 18 105/68 81 99 %     Blood Pressures         4/5/2025 1959             BP: 105/68             Physical Exam  General: NAD, mood appropriate  Cardiopulmonary: warm and well perfused, breathing comfortably on room air  Abdomen: Gravid, soft, bowel sounds present in all 4 quadrants, tender to palpation in the RLQ, negative Woodard's sign, positive McBurney's point.   Extremities: Symmetric     Fetal Monitoring  FHR: 155 bpm by doppler    Labs in chart were reviewed.  CBC   Recent Labs     04/05/25  2238   WBC 11.6*   HGB 10.0*   HCT 30.2*        CMP   Recent Labs     04/05/25  2238   *   K 4.1      CO2 22   ANIONGAP 13   BUN 6   CREATININE 0.48*   EGFR >90   CALCIUM 9.2   ALBUMIN 3.7   PROT 6.3*   ALKPHOS 67   ALT 5*   AST 9   BILITOT 0.2             Prenatal labs reviewed, not remarkable.

## 2025-04-14 ENCOUNTER — ROUTINE PRENATAL (OUTPATIENT)
Dept: OBSTETRICS AND GYNECOLOGY | Facility: HOSPITAL | Age: 25
End: 2025-04-14
Payer: COMMERCIAL

## 2025-04-14 ENCOUNTER — HOSPITAL ENCOUNTER (OUTPATIENT)
Dept: RADIOLOGY | Facility: HOSPITAL | Age: 25
Discharge: HOME | End: 2025-04-14
Payer: COMMERCIAL

## 2025-04-14 VITALS — WEIGHT: 163 LBS | SYSTOLIC BLOOD PRESSURE: 105 MMHG | BODY MASS INDEX: 27.98 KG/M2 | DIASTOLIC BLOOD PRESSURE: 70 MMHG

## 2025-04-14 DIAGNOSIS — Z3A.01 LESS THAN 8 WEEKS GESTATION OF PREGNANCY (HHS-HCC): ICD-10-CM

## 2025-04-14 DIAGNOSIS — Z3A.20 20 WEEKS GESTATION OF PREGNANCY (HHS-HCC): Primary | ICD-10-CM

## 2025-04-14 DIAGNOSIS — O34.219 UTERINE SCAR FROM PREVIOUS CESAREAN DELIVERY AFFECTING PREGNANCY (HHS-HCC): ICD-10-CM

## 2025-04-14 PROCEDURE — 76805 OB US >/= 14 WKS SNGL FETUS: CPT

## 2025-04-14 PROCEDURE — 99212 OFFICE O/P EST SF 10 MIN: CPT | Mod: TH,25

## 2025-04-14 PROCEDURE — 76811 OB US DETAILED SNGL FETUS: CPT | Performed by: OBSTETRICS & GYNECOLOGY

## 2025-04-14 PROCEDURE — 99212 OFFICE O/P EST SF 10 MIN: CPT

## 2025-04-14 PROCEDURE — 76811 OB US DETAILED SNGL FETUS: CPT

## 2025-04-14 RX ORDER — FERROUS SULFATE 325(65) MG
325 TABLET ORAL
Qty: 90 TABLET | Refills: 3 | Status: SHIPPED | OUTPATIENT
Start: 2025-04-14 | End: 2026-04-14

## 2025-04-14 NOTE — PROGRESS NOTES
Ob Visit  25     SUBJECTIVE      HPI: Fatou Zambrano is a 24 y.o.  at 20w4d here for RPNV.  She has no contractions, no bleeding, or no LOF. Reports normal fetal movement. Patient reports no concerns       OBJECTIVE  Visit Vitals  /70   Wt 73.9 kg (163 lb)   LMP 2024   BMI 27.98 kg/m²   OB Status Pregnant   Smoking Status Never   BSA 1.83 m²            ASSESSMENT & PLAN    Fatou Zambrano is a 24 y.o.  at 20w4d here for the following concerns we addressed today:    Problem List Items Addressed This Visit       Uterine scar from previous  delivery affecting pregnancy ([facility])    Overview     Desires rLTCS    TUBAL PAPEROWORK AT NEXT FREDERIC         20 weeks gestation of pregnancy ([facility]) - Primary    Overview     Desired provider in labor: [] CNM  [] Physician  [x] Blood Products: [x] Yes, accepts [] No, needs counseling  [x] Initial BMI: 26.20  [x] Prenatal Labs:   [x] Cervical Cancer Screening up to date due PP  [x] Rh status: Rh+  [x] Genetic Screening:   RR cf DNA male  [x] NT US: (11-13 wks)  [x] Baby ASA (if indicated):  [x] Pregnancy dated by: LMP    [x] Anatomy US: (19-20 wks) completion of anatomy scheduled in 2 weeks; otherwise WNL  [] Federal Sterilization consent signed (if indicated):  [] 1hr GCT at 24-28wks:  [] Rhogam (if indicated): Not applicable  [] Fetal Surveillance (if indicated):  [] Tdap (27-32 wks, may be given up to 36 wks if initial window missed):   [] RSV (32-36 wks) (Sept. to end of ):   [] Flu Vaccine:    [] Breastfeeding:  [x] Postpartum Birth control method: PPTL  [] GBS at 36 - 37 wks:  [] 39 weeks discussion of IOL vs. Expectant management:  [x] Mode of delivery ( anticipated ): rLTCS           Relevant Medications    ferrous sulfate 325 (65 Fe) mg tablet     Other Visit Diagnoses       Less than 8 weeks gestation of pregnancy ([facility])        Relevant Medications    ferrous sulfate 325 (65 Fe) mg tablet              RTC in 4  weeks      Radha Eid, APRN-CNM

## 2025-04-30 ENCOUNTER — HOSPITAL ENCOUNTER (OUTPATIENT)
Dept: RADIOLOGY | Facility: HOSPITAL | Age: 25
Discharge: HOME | End: 2025-04-30
Payer: COMMERCIAL

## 2025-04-30 DIAGNOSIS — Z3A.01 LESS THAN 8 WEEKS GESTATION OF PREGNANCY (HHS-HCC): ICD-10-CM

## 2025-04-30 PROCEDURE — 76816 OB US FOLLOW-UP PER FETUS: CPT

## 2025-05-08 PROBLEM — J00 ACUTE NASOPHARYNGITIS (COMMON COLD): Status: ACTIVE | Noted: 2025-05-08

## 2025-05-08 PROBLEM — T74.21XD: Status: ACTIVE | Noted: 2025-05-08

## 2025-05-08 PROBLEM — N63.0 BREAST LUMP: Status: ACTIVE | Noted: 2025-05-08

## 2025-05-08 PROBLEM — O99.891 BACK PAIN AFFECTING PREGNANCY: Status: ACTIVE | Noted: 2025-05-08

## 2025-05-08 PROBLEM — A60.00 HERPES SIMPLEX INFECTION OF GENITOURINARY SYSTEM: Status: ACTIVE | Noted: 2025-05-08

## 2025-05-08 PROBLEM — O28.3 ABNORMAL FETAL ULTRASOUND: Status: ACTIVE | Noted: 2025-05-08

## 2025-05-08 PROBLEM — M54.9 BACK PAIN AFFECTING PREGNANCY: Status: ACTIVE | Noted: 2025-05-08

## 2025-05-08 PROBLEM — F41.9 ANXIETY: Status: ACTIVE | Noted: 2025-05-08

## 2025-05-13 ENCOUNTER — APPOINTMENT (OUTPATIENT)
Dept: OBSTETRICS AND GYNECOLOGY | Facility: HOSPITAL | Age: 25
End: 2025-05-13
Payer: COMMERCIAL

## 2025-05-19 ENCOUNTER — OFFICE VISIT (OUTPATIENT)
Dept: PEDIATRIC CARDIOLOGY | Facility: HOSPITAL | Age: 25
End: 2025-05-19
Payer: COMMERCIAL

## 2025-05-19 ENCOUNTER — HOSPITAL ENCOUNTER (OUTPATIENT)
Dept: PEDIATRIC CARDIOLOGY | Facility: HOSPITAL | Age: 25
Discharge: HOME | End: 2025-05-19
Payer: COMMERCIAL

## 2025-05-19 VITALS
WEIGHT: 171.74 LBS | OXYGEN SATURATION: 99 % | BODY MASS INDEX: 26.96 KG/M2 | HEART RATE: 84 BPM | DIASTOLIC BLOOD PRESSURE: 70 MMHG | SYSTOLIC BLOOD PRESSURE: 110 MMHG | HEIGHT: 67 IN

## 2025-05-19 DIAGNOSIS — O35.9XX0 SUSPECTED FETAL ABNORMALITY AFFECTING MANAGEMENT OF MOTHER, SINGLE OR UNSPECIFIED FETUS (HHS-HCC): Primary | ICD-10-CM

## 2025-05-19 DIAGNOSIS — O35.8XX0 MATERNAL CARE FOR OTHER (SUSPECTED) FETAL ABNORMALITY AND DAMAGE, NOT APPLICABLE OR UNSPECIFIED (HHS-HCC): ICD-10-CM

## 2025-05-19 DIAGNOSIS — O35.9XX1 SUSPECTED FETAL ANOMALY, ANTEPARTUM, FETUS 1 (HHS-HCC): ICD-10-CM

## 2025-05-19 PROCEDURE — 99214 OFFICE O/P EST MOD 30 MIN: CPT | Mod: 25 | Performed by: PEDIATRICS

## 2025-05-19 PROCEDURE — 76827 ECHO EXAM OF FETAL HEART: CPT | Performed by: PEDIATRICS

## 2025-05-19 PROCEDURE — 93325 DOPPLER ECHO COLOR FLOW MAPG: CPT | Performed by: PEDIATRICS

## 2025-05-19 PROCEDURE — 76825 ECHO EXAM OF FETAL HEART: CPT

## 2025-05-19 PROCEDURE — 76825 ECHO EXAM OF FETAL HEART: CPT | Performed by: PEDIATRICS
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
## 2025-05-19 NOTE — PATIENT INSTRUCTIONS
On fetal echocardiogram today the structure, size, function (squeeze), and rhythm of your fetus' heart were normal.  There are some limitations to fetal echocardiogram as described below, and because it is not a perfect test it is important to know that we cannot rule out all possible heart problems (see below).  We will communicate these results with your obstetrician.    It was a pleasure to see you today.  We do not need to see you back for routine follow-up during the remainder of your pregnancy.  However, we would be happy to see you back if new issues or concerns arise.  After birth your baby does not need to see a cardiologist unless the pediatric team has concerns.  If you have any questions or concerns regarding this evaluation, please do not hesitate to contact me.    Pati Silva MD   of Pediatrics  Division of Pediatric Cardiology  [place] [address]  Phone: [phone]  Fax: [phone]  e-mail: [email or web address]    [email or web address]    Normal Fetal Echocardiogram    Today you had an ultrasound of your fetus' heart (fetal echocardiogram).  Based on all of the pictures taken today, the heart appears normal and is developing as expected for this point in your pregnancy.   Therefore, no further testing is recommended at this time.    Despite today´s normal findings, it is impossible to rule out all heart problems before birth.  This is partially because the fetus´ heart is so small, and our machine´s technology can only see structures down to a certain size.  It is also because blood flow in a fetus is different and more complicated than blood flow after birth.    Briefly:  ° Fetuses get oxygen from the placenta instead of their lungs.  High-oxygen (red) blood from the placenta comes back to the right side of the fetus´  heart.  ° Red blood crosses to the left side of the heart through a hole between the upper chambers of the heart, the foramen ovale.  The foramen ovale lets the red blood flow to the body.  ° Low-oxygen (blue) blood stays on the right side of the heart.  After leaving the heart, the blue blood flows through a special blood vessel known as the ductus arteriosus.  The ductus arteriosus allows the blue blood to bypass the lungs and return to the placenta to get oxygen.  ° After birth the foramen ovale and ductus arteriosus should close, but sometimes they do not.  It is impossible to predict in which children these structures will remain open.    ° Thus, on fetal echo we cannot rule out that your baby will have a patent foramen ovale (PFO) or patent ductus arteriosus (PDA) after birth.    In addition, fetal echocardiography can miss other heart defects including:  ° Small or medium-sized holes between the upper or lower heart chambers.  ° Minor abnormalities of the heart valves.  ° Narrowing of the main artery that goes to the body (coarctation of the aorta).  ° Other rare abnormalities of the veins or arteries.    If any of these defects are present, there should be findings after birth that will alert your child´s doctor that there is a problem and prompt further evaluation.  After delivery, if your pediatrician has any concerns, your child's heart can be reevaluated at that time.

## 2025-05-20 ENCOUNTER — APPOINTMENT (OUTPATIENT)
Dept: OBSTETRICS AND GYNECOLOGY | Facility: HOSPITAL | Age: 25
End: 2025-05-20
Payer: COMMERCIAL

## 2025-05-20 ENCOUNTER — ROUTINE PRENATAL (OUTPATIENT)
Dept: OBSTETRICS AND GYNECOLOGY | Facility: HOSPITAL | Age: 25
End: 2025-05-20
Payer: COMMERCIAL

## 2025-05-20 VITALS — SYSTOLIC BLOOD PRESSURE: 110 MMHG | BODY MASS INDEX: 27.16 KG/M2 | WEIGHT: 171 LBS | DIASTOLIC BLOOD PRESSURE: 72 MMHG

## 2025-05-20 DIAGNOSIS — M54.31 SCIATIC NERVE PAIN, RIGHT: ICD-10-CM

## 2025-05-20 DIAGNOSIS — Z3A.25 25 WEEKS GESTATION OF PREGNANCY (HHS-HCC): Primary | ICD-10-CM

## 2025-05-20 DIAGNOSIS — Z36.9 ENCOUNTER FOR ANTENATAL SCREENING: ICD-10-CM

## 2025-05-20 PROCEDURE — 36415 COLL VENOUS BLD VENIPUNCTURE: CPT

## 2025-05-20 PROCEDURE — 85027 COMPLETE CBC AUTOMATED: CPT

## 2025-05-20 PROCEDURE — 99213 OFFICE O/P EST LOW 20 MIN: CPT | Mod: TH

## 2025-05-20 PROCEDURE — 99213 OFFICE O/P EST LOW 20 MIN: CPT

## 2025-05-20 NOTE — LETTER
May 20, 2025     Patient: Fatou Zambrano   YOB: 2000   Date of Visit: 5/20/2025       To Whom It May Concern:    It is my medical opinion that Fatou Zambrano may not lift, or pull more than 20 lbs beginning on 5/20/2025 until patient delivers. Patient also should refrain from standing on ladders, heavy lifting, prolonged standing, and certain high-impact activities.     If you have any questions or concerns, please don't hesitate to call.         Sincerely,      Nargis Candelario RN   On behalf of JAYLYN Hills

## 2025-05-20 NOTE — PROGRESS NOTES
Ob Visit  25     SUBJECTIVE      HPI: Fatou Zambrano is a 24 y.o.  at 25w5d here for RPNV.  She has no contractions, no bleeding, or no LOF. Reports normal fetal movement. Patient reports right sided pain that begins in her lower back and radiates down her leg.  She states that it is beginning to hinder her ability to walk.       OBJECTIVE  Visit Vitals  /72   Wt 77.6 kg (171 lb)   LMP 2024   BMI 27.16 kg/m²   OB Status Pregnant   Smoking Status Never   BSA 1.91 m²            ASSESSMENT & PLAN    Fatou Zambrano is a 24 y.o.  at 25w5d here for the following concerns we addressed today:    Problem List Items Addressed This Visit       25 weeks gestation of pregnancy ([facility]) - Primary    Overview   Desired provider in labor: [] CNM  [] Physician  [x] Blood Products: [x] Yes, accepts [] No, needs counseling  [x] Initial BMI: 26.20  [x] Prenatal Labs:   [x] Cervical Cancer Screening up to date due PP  [x] Rh status: Rh+  [x] Genetic Screening:   RR cf DNA male  [x] NT US: (11-13 wks)  [x] Baby ASA (if indicated):  [x] Pregnancy dated by: LMP    [x] Anatomy US: (19-20 wks) completion of anatomy scheduled in 2 weeks; otherwise WNL  [] Federal Sterilization consent signed (if indicated):  [] 1hr GCT at 24-28wks:  [] Rhogam (if indicated): Not applicable  [] Fetal Surveillance (if indicated):  [] Tdap (27-32 wks, may be given up to 36 wks if initial window missed):   [] RSV (32-36 wks) (Sept. to end of ):   [] Flu Vaccine:    [] Breastfeeding:  [x] Postpartum Birth control method: PPTL  [] GBS at 36 - 37 wks:  [] 39 weeks discussion of IOL vs. Expectant management:  [x] Mode of delivery ( anticipated ): rLTCS           Relevant Medications    prenatal vitamin, iron-folic, 27 mg iron-800 mcg folic acid tablet     Other Visit Diagnoses         Encounter for  screening        Relevant Orders    Glucose, 1 Hour Screen, Pregnancy    Syphilis Screen with Reflex    CBC Anemia  Panel With Reflex,Pregnancy      Sciatic nerve pain, right        Relevant Orders    Referral to [facility]    Referral to [facility]              RTC in 4 weeks      Radha Eid, YAW-AGAPITOM

## 2025-05-21 LAB
GLUCOSE 1H P 50 G GLC PO SERPL-MCNC: 72 MG/DL
T PALLIDUM AB SER QL IA: NORMAL

## 2025-05-22 ENCOUNTER — LAB (OUTPATIENT)
Dept: LAB | Facility: HOSPITAL | Age: 25
End: 2025-05-22
Payer: COMMERCIAL

## 2025-05-22 DIAGNOSIS — Z36.9 ENCOUNTER FOR ANTENATAL SCREENING, UNSPECIFIED: Primary | ICD-10-CM

## 2025-05-22 LAB
ERYTHROCYTE [DISTWIDTH] IN BLOOD BY AUTOMATED COUNT: 13.4 % (ref 11.5–14.5)
HCT VFR BLD AUTO: 37 % (ref 36–46)
HGB BLD-MCNC: 11.1 G/DL (ref 12–16)
MCH RBC QN AUTO: 29.5 PG (ref 26–34)
MCHC RBC AUTO-ENTMCNC: 30 G/DL (ref 32–36)
MCV RBC AUTO: 98 FL (ref 80–100)
NRBC BLD-RTO: 0 /100 WBCS (ref 0–0)
PLATELET # BLD AUTO: 278 X10*3/UL (ref 150–450)
RBC # BLD AUTO: 3.76 X10*6/UL (ref 4–5.2)
REFLEX ADDED, ANEMIA PANEL: NORMAL
WBC # BLD AUTO: 11.1 X10*3/UL (ref 4.4–11.3)

## 2025-05-24 ENCOUNTER — TELEPHONE (OUTPATIENT)
Dept: OBSTETRICS AND GYNECOLOGY | Facility: HOSPITAL | Age: 25
End: 2025-05-24
Payer: COMMERCIAL

## 2025-05-25 NOTE — TELEPHONE ENCOUNTER
Pt. Calling on call pager due to slipping on the stairs.   She notes she was inside the house and had socks on, slipped on carpeted stairs. Fell and hit her butt/back.     No abdominal trauma.   Reports +FM.   Discussed always option of being evaluated if desired and discussed warning signs if she monitors at home of VB, dec FM, LOF, or contractions.   Discussed Tylenol, heat packs as needed.     Mimi Grant, APRN-AGAPITOM

## 2025-05-26 LAB
GLUCOSE 1H P 50 G GLC PO SERPL-MCNC: 72 MG/DL
T PALLIDUM AB SER QL IA: NEGATIVE

## 2025-05-28 ENCOUNTER — APPOINTMENT (OUTPATIENT)
Dept: OBSTETRICS AND GYNECOLOGY | Facility: HOSPITAL | Age: 25
End: 2025-05-28
Payer: COMMERCIAL

## 2025-05-29 ENCOUNTER — DOCUMENTATION (OUTPATIENT)
Dept: INTEGRATIVE MEDICINE | Facility: CLINIC | Age: 25
End: 2025-05-29
Payer: COMMERCIAL

## 2025-05-29 NOTE — PROGRESS NOTES
Assessment/Plan   Fatou Zambrano      nonspecific MSk pain without red flags. Complicated by psychosocial stressors. No contraindications to initiating chiropractic care. Prognosis is good. No additional testing is needed at this time. We plan to provide      continued education re: pain complaint   manual therapy (mobilization, manipulation, myofascial treatment)   progressive rehabilitation exercise    mind-body therapies     Advised 6 visits of conservative care before re-evaluation       No spine imaging results found for the past 12 months     1/15 [facility]    Subjective     _________________________________________________________________________________________________  HPI - Fatou Zambrano was seen as a new patient *** for evaluation and management of *** she is 26 weeks pregnant and experiencing Low back pain radiationg down the R leg.     Review of Systems    Objective   Examination findings:  Active range of motion:   Tender to palpate (***/4):    Specific regions of segmental dysfunction as established by focal Pain and Range of motion restrictions into extension, flexion, and rotation:   Cervical:   Thoracic:   Lumbopelvic:      Physical Exam  *** date  Gait WNL to include heel-toe and tandem    Inspection WNL without lesion, scar, or deformity   FROM spine, shoulders, and hips   No Romberg sign elicited   UE and LE reflexes 2/4 bilaterally  Pallesthesia intact x4     Plan   No further testing indicated at this time  Continued education re: pain complaint   Manual therapy (mobilization, manipulation, myofascial treatment) depending on patient tolerance   Progressive rehabilitation   Mind-body therapies   RTC once per week then every 2 weeks.   Advised 6 visits of conservative care before re-evaluation.      Discussed the working diagnosis and explained the proposed treatment options and preferred plan to include risks, benefits, and alternatives to care. I answered all of the patient questions.  Patient provided verbal consent to begin care.     Goals for care established     [place] Pain Questionnaire: ***/100%  0-20% indicates minimal disability  21-40% indicates moderate disability  41-60% indicates severe disability  61-80% indicates crippled  % suggest the individual is bedridden    Today's treatment:    Chiropractic manipulative therapy provided to the following specific regions of segmental dysfunction (patient position in parenthesis):    C5/6 (supine manipulation  mobilization)   C6/7 (supine manipulation  mobilization)   C7/T1 (prone)   T5/6 (supine) (prone) (seated mobilizations)   T7/8 (supine) (prone) (seated mobilizations)   L4/5 (decubitus) (flexion-distraction)   L5/S1 (decubitus) (flexion-distraction)     Myofascial therapy provided:   Start time:   End time:    Provided education and established a home plan listed in patient instructions    Patient tolerated treatment well and without incident.     *Please note this report has been produced using speech recognition software and may contain errors related to that system including grammar, punctuation and spelling as well as words and phrases that may be inappropriate. If there are questions or concerns, please feel free to contact me to clarify.

## 2025-05-30 ENCOUNTER — APPOINTMENT (OUTPATIENT)
Dept: INTEGRATIVE MEDICINE | Facility: CLINIC | Age: 25
End: 2025-05-30
Payer: COMMERCIAL

## 2025-05-31 DIAGNOSIS — N94.9 ROUND LIGAMENT PAIN: Primary | ICD-10-CM

## 2025-05-31 RX ORDER — ACETAMINOPHEN 500 MG
1000 TABLET ORAL EVERY 6 HOURS PRN
Qty: 30 TABLET | Refills: 0 | Status: SHIPPED | OUTPATIENT
Start: 2025-05-31 | End: 2025-06-10

## 2025-05-31 NOTE — PROGRESS NOTES
"24 year old  at 27.2 weeks by LMP calling for concerns of SOB and \"feeling like she is going to have a panic attack\" when she lays down.  She was laying down to go to bed and the episode began.  She admits to laying flat on her back initially, and when she sat back up the feeling of SOB began to subside.  She then tried to lay down again and she felt the same symptoms again.  She states that she had 3 pillows behind her for the second attempt.  Denies chest pain, lightheadedness and is able to carry a conversation without feeling short of breath during the call.  She also has concerns regarding a sharp pain that occurred on the left side of her abdomen; has resolved at the time of the call.  She doesn't have Tylenol in her home and she is unsure where her heating pad is.  Offered to send Tylenol to the pharmacy on file so she has it on hand; patient accepted.    Due to symptoms only occurring while laying and resolves with sitting up; reassured patient that this may be caused by the fetus being positioned in a way that is causing discomfort; patient verbalized understanding.  Encouraged patient to try laying in a position where she is more upright, or slowly lowering herself to a comfortable position.  Encouraged patient to present to triage if symptoms become more prolonged or are worsening; patient verbalized understanding and agreed to the above plan.    Radha Eid, YAW-ALLY    "

## 2025-06-02 ENCOUNTER — APPOINTMENT (OUTPATIENT)
Dept: INTEGRATIVE MEDICINE | Facility: CLINIC | Age: 25
End: 2025-06-02
Payer: COMMERCIAL

## 2025-06-03 ENCOUNTER — APPOINTMENT (OUTPATIENT)
Dept: OBSTETRICS AND GYNECOLOGY | Facility: HOSPITAL | Age: 25
End: 2025-06-03
Payer: COMMERCIAL

## 2025-06-03 VITALS — BODY MASS INDEX: 27.48 KG/M2 | WEIGHT: 173 LBS | DIASTOLIC BLOOD PRESSURE: 69 MMHG | SYSTOLIC BLOOD PRESSURE: 109 MMHG

## 2025-06-03 DIAGNOSIS — Z3A.27 27 WEEKS GESTATION OF PREGNANCY (HHS-HCC): Primary | ICD-10-CM

## 2025-06-03 DIAGNOSIS — O99.891 BACK PAIN AFFECTING PREGNANCY: ICD-10-CM

## 2025-06-03 DIAGNOSIS — M54.9 BACK PAIN AFFECTING PREGNANCY: ICD-10-CM

## 2025-06-03 PROBLEM — F41.9 ANXIETY: Status: ACTIVE | Noted: 2025-06-03

## 2025-06-03 PROCEDURE — 99212 OFFICE O/P EST SF 10 MIN: CPT | Mod: TH

## 2025-06-03 PROCEDURE — 99212 OFFICE O/P EST SF 10 MIN: CPT

## 2025-06-03 ASSESSMENT — LIFESTYLE VARIABLES
HOW OFTEN DO YOU HAVE SIX OR MORE DRINKS ON ONE OCCASION: NEVER
AUDIT-C TOTAL SCORE: 0
SKIP TO QUESTIONS 9-10: 1
HOW OFTEN DO YOU HAVE A DRINK CONTAINING ALCOHOL: NEVER
HOW MANY STANDARD DRINKS CONTAINING ALCOHOL DO YOU HAVE ON A TYPICAL DAY: PATIENT DOES NOT DRINK

## 2025-06-03 ASSESSMENT — PATIENT HEALTH QUESTIONNAIRE - PHQ9
SUM OF ALL RESPONSES TO PHQ9 QUESTIONS 1 & 2: 0
2. FEELING DOWN, DEPRESSED OR HOPELESS: NOT AT ALL
10. IF YOU CHECKED OFF ANY PROBLEMS, HOW DIFFICULT HAVE THESE PROBLEMS MADE IT FOR YOU TO DO YOUR WORK, TAKE CARE OF THINGS AT HOME, OR GET ALONG WITH OTHER PEOPLE: NOT DIFFICULT AT ALL
1. LITTLE INTEREST OR PLEASURE IN DOING THINGS: NOT AT ALL

## 2025-06-03 NOTE — PROGRESS NOTES
Ob Visit  25     SUBJECTIVE      HPI: Fatou Zambrano is a 24 y.o.  at 27w5d here for RPNV.  She has no contractions, no bleeding, or no LOF. Reports normal fetal movement. Patient reports no OB concerns.       OBJECTIVE  Visit Vitals  /69   Wt 78.5 kg (173 lb)   LMP 2024   BMI 27.48 kg/m²   OB Status Pregnant   Smoking Status Never   BSA 1.92 m²            ASSESSMENT & PLAN    Fatou Zambrano is a 24 y.o.  at 27w5d here for the following concerns we addressed today:    Problem List Items Addressed This Visit       27 weeks gestation of pregnancy ([facility]) - Primary    Overview   Desired provider in labor: [] CNM  [] Physician  [x] Blood Products: [x] Yes, accepts [] No, needs counseling  [x] Initial BMI: 26.20  [x] Prenatal Labs:   [x] Cervical Cancer Screening up to date due PP  [x] Rh status: Rh+  [x] Genetic Screening:   RR cf DNA male  [x] NT US: (11-13 wks)  [x] Baby ASA (if indicated):  [x] Pregnancy dated by: LMP    [x] Anatomy US: (19-20 wks) WNL  [] Federal Sterilization consent signed (if indicated):  [x] 1hr GCT at 24-28wks: 72  [] Rhogam (if indicated): Not applicable  [] Fetal Surveillance (if indicated):  [] Tdap (27-32 wks, may be given up to 36 wks if initial window missed):   [] RSV (32-36 wks) (Sept. to end of ):   [] Flu Vaccine:    [] Breastfeeding:  [x] Postpartum Birth control method: PPTL  [] GBS at 36 - 37 wks:  [] 39 weeks discussion of IOL vs. Expectant management:  [x] Mode of delivery ( anticipated ): rLTCS           Back pain affecting pregnancy         RTC in 2 weeks      Radha Eid, YAW-ALLY

## 2025-06-06 ENCOUNTER — TELEPHONE (OUTPATIENT)
Facility: CLINIC | Age: 25
End: 2025-06-06

## 2025-06-06 ENCOUNTER — APPOINTMENT (OUTPATIENT)
Dept: INTEGRATIVE MEDICINE | Facility: CLINIC | Age: 25
End: 2025-06-06
Payer: COMMERCIAL

## 2025-06-06 ENCOUNTER — HOSPITAL ENCOUNTER (OUTPATIENT)
Facility: HOSPITAL | Age: 25
End: 2025-06-06
Attending: OBSTETRICS & GYNECOLOGY | Admitting: OBSTETRICS & GYNECOLOGY
Payer: COMMERCIAL

## 2025-06-06 ENCOUNTER — HOSPITAL ENCOUNTER (OUTPATIENT)
Facility: HOSPITAL | Age: 25
Discharge: HOME | End: 2025-06-06
Attending: OBSTETRICS & GYNECOLOGY | Admitting: OBSTETRICS & GYNECOLOGY
Payer: COMMERCIAL

## 2025-06-06 VITALS
HEIGHT: 64 IN | TEMPERATURE: 98.2 F | WEIGHT: 175.71 LBS | RESPIRATION RATE: 16 BRPM | SYSTOLIC BLOOD PRESSURE: 93 MMHG | DIASTOLIC BLOOD PRESSURE: 52 MMHG | HEART RATE: 79 BPM | BODY MASS INDEX: 30 KG/M2 | OXYGEN SATURATION: 100 %

## 2025-06-06 LAB
BILIRUBIN, POC: NEGATIVE
BLOOD URINE, POC: NEGATIVE
CLARITY, POC: CLEAR
COLOR, POC: YELLOW
GLUCOSE URINE, POC: NEGATIVE
KETONES, POC: NEGATIVE
LEUKOCYTE EST, POC: NORMAL
NITRITE, POC: NEGATIVE
PH, POC: 6.5
POC APPEARANCE OF BODY FLUID: NORMAL
SPECIFIC GRAVITY, POC: 1.01
URINE PROTEIN, POC: NEGATIVE
UROBILINOGEN, POC: 0.2

## 2025-06-06 PROCEDURE — 59025 FETAL NON-STRESS TEST: CPT | Performed by: NURSE PRACTITIONER

## 2025-06-06 PROCEDURE — 59025 FETAL NON-STRESS TEST: CPT

## 2025-06-06 PROCEDURE — 2500000001 HC RX 250 WO HCPCS SELF ADMINISTERED DRUGS (ALT 637 FOR MEDICARE OP): Mod: SE

## 2025-06-06 PROCEDURE — 99213 OFFICE O/P EST LOW 20 MIN: CPT | Performed by: NURSE PRACTITIONER

## 2025-06-06 PROCEDURE — 99215 OFFICE O/P EST HI 40 MIN: CPT

## 2025-06-06 RX ORDER — ONDANSETRON 4 MG/1
4 TABLET, FILM COATED ORAL EVERY 6 HOURS PRN
Status: DISCONTINUED | OUTPATIENT
Start: 2025-06-06 | End: 2025-06-06 | Stop reason: HOSPADM

## 2025-06-06 RX ORDER — CYCLOBENZAPRINE HCL 10 MG
10 TABLET ORAL ONCE
Status: COMPLETED | OUTPATIENT
Start: 2025-06-06 | End: 2025-06-06

## 2025-06-06 RX ORDER — ONDANSETRON HYDROCHLORIDE 2 MG/ML
4 INJECTION, SOLUTION INTRAVENOUS EVERY 6 HOURS PRN
Status: DISCONTINUED | OUTPATIENT
Start: 2025-06-06 | End: 2025-06-06 | Stop reason: HOSPADM

## 2025-06-06 RX ADMIN — CYCLOBENZAPRINE 10 MG: 10 TABLET, FILM COATED ORAL at 06:05

## 2025-06-06 ASSESSMENT — PAIN SCALES - GENERAL
PAINLEVEL_OUTOF10: 7
PAINLEVEL_OUTOF10: 7
PAINLEVEL_OUTOF10: 0 - NO PAIN

## 2025-06-06 NOTE — H&P
Triage H&P    Fatou Zambrano is a 24 y.o. year old  at 28w1d who presents to triage for   Chief Complaint   Patient presents with    Abdominal Pain     Sharp pain near c/s scar resolved to lower back    Contractions        Assessment/Plan:    Suprapubic Pain  -Flexeril 10mg ordered, reassess pain 30 mins after admin  -Plan for SVE at reassessment  -[place] with irritability  -Udip WNL  -VSS    IUP at 28w1d   - NST reactive  - Good fetal movement  - Denies leaking of fluid/vaginal bleeding      Melissa L Zahorsky, APRN-CNM      AM Update:  Report received from M. Zahorsky, CNM.     Pt seen at bedside, resting comfortably.   Pain resolved, declines recheck.  Discussed return precautions.     Dispo: Stable for dc home. Next OB appt .     Plan and tracing reviewed with Dr. Odom.       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         27 weeks gestation of pregnancy ([facility])    Overview Addendum 6/3/2025  1:58 PM by JAYLYN Hills   Desired provider in labor: [] CNM  [] Physician  [x] Blood Products: [x] Yes, accepts [] No, needs counseling  [x] Initial BMI: 26.20  [x] Prenatal Labs:   [x] Cervical Cancer Screening up to date due PP  [x] Rh status: Rh+  [x] Genetic Screening:   RR cf DNA male  [x] NT US: (11-13 wks)  [x] Baby ASA (if indicated):  [x] Pregnancy dated by: LMP    [x] Anatomy US: (19-20 wks) WNL  [] Federal Sterilization consent signed (if indicated):  [x] 1hr GCT at 24-28wks: 72  [] Rhogam (if indicated): Not applicable  [] Fetal  "Surveillance (if indicated):  [] Tdap (27-32 wks, may be given up to 36 wks if initial window missed):   [] RSV (32-36 wks) (Sept. to end ):   [] Flu Vaccine:    [] Breastfeeding:  [x] Postpartum Birth control method: PPTL  [] GBS at 36 - 37 wks:  [] 39 weeks discussion of IOL vs. Expectant management:  [x] Mode of delivery ( anticipated ): rLTCS           Sickle cell trait    Nausea and vomiting during pregnancy prior to 22 weeks gestation    Back pain affecting pregnancy    Anxiety           Subjective   Fatou Zambrano is a 24 y.o. year old  at 28w1d who presents to triage for suprapubic cramping pain that began after having intercourse early this morning. During intercourse, began feeling a sharp pain on her right side so they \"didn't finish.\" Then began experiencing cramping-like pain that she rates 10/10. Cramping initially occurring every 10-15 minutes but now reports every 5-6 minutes. Pain intensity has stayed the same despite taking 1,000mg of tylenol and a warm bath. States she was wondering if this was fer fragoso.     Denies leaking of fluid and vaginal bleeding. Reports good fetal movement. Denies dysuria, vaginal irritation/odor.        OB History          4    Para   2    Term   2            AB   1    Living   2         SAB   1    IAB        Ectopic        Multiple        Live Births   2                  Surgical History[1]     Social History     Socioeconomic History    Marital status: Single     Spouse name: Not on file    Number of children: Not on file    Years of education: Not on file    Highest education level: Not on file   Occupational History    Not on file   Tobacco Use    Smoking status: Never    Smokeless tobacco: Never   Vaping Use    Vaping status: Never Used   Substance and Sexual Activity    Alcohol use: Never    Drug use: Never    Sexual activity: Yes     Partners: Male   Other Topics Concern    Not on file   Social History Narrative    Not on file "     Social Drivers of Health     Financial Resource Strain: Low Risk  (4/5/2025)    Overall Financial Resource Strain (CARDIA)     Difficulty of Paying Living Expenses: Not hard at all   Food Insecurity: No Food Insecurity (4/5/2025)    Hunger Vital Sign     Worried About Running Out of Food in the Last Year: Never true     Ran Out of Food in the Last Year: Never true   Transportation Needs: No Transportation Needs (4/5/2025)    PRAPARE - Transportation     Lack of Transportation (Medical): No     Lack of Transportation (Non-Medical): No   Physical Activity: Not on file   Stress: No Stress Concern Present (12/24/2024)    [employer] [place] [employer] - Occupational Stress Questionnaire     Feeling of Stress : Only a little   Social Connections: Not on file   Intimate Partner Violence: Not At Risk (6/3/2025)    Humiliation, Afraid, Rape, and Kick questionnaire     Fear of Current or Ex-Partner: No     Emotionally Abused: No     Physically Abused: No     Sexually Abused: No        RX Allergies[2]     Prescriptions Prior to Admission[3]     Objective     Visit Vitals  /64   Pulse 82   Temp 36.5 °C (97.7 °F) (Skin)   Resp 16        Physical Exam  Physical Exam  Constitutional:       Comments: Tearful upon entering room  Calm, no sign of distress during patient-reported cramping   Cardiovascular:      Rate and Rhythm: Normal rate.   Pulmonary:      Effort: Pulmonary effort is normal.   Abdominal:      Comments: Suprapubic tenderness with palpation  Gravid abdomen palpated soft during patient-reported cramping   Genitourinary:     Comments: Deferred  Skin:     General: Skin is warm and dry.   Neurological:      Mental Status: She is alert.        NST  Baseline 135, moderate variability, 2 10x10 accels, no decels    [place]: irritability    Labs  Labs in chart were reviewed.      Admission on 06/06/2025   Component Date Value Ref Range Status    Color, UA 06/06/2025 Yellow   In process    Clarity, UA  2025 Clear   In process    Glucose, UA 2025 NEGATIVE   In process    Bilirubin, UA 2025 NEGATIVE   In process    Ketones, UA 2025 Negative   In process    Spec Grav, UA 2025 1.015   In process    Blood, UA 2025 Negative   In process    pH, UA 2025 6.5   In process    Protein, UA 2025 NEGATIVE   In process    Urobilinogen, UA 2025 0.2   In process    Leukocytes, UA 2025 +   In process    Nitrite, UA 2025 NEGATIVE   In process            [1]   Past Surgical History:  Procedure Laterality Date    OTHER SURGICAL HISTORY  2021     section   [2]   Allergies  Allergen Reactions    Amoxicillin Unknown    Other Unknown     Penicillins  Cross Reactors      Penicillin Unknown    Tomato Itching     The patient states her tongue and tonsils itch after eating Tomatoes.    Penicillins Rash   [3]   Facility-Administered Medications Prior to Admission   Medication Dose Route Frequency Provider Last Rate Last Admin    medroxyPROGESTERone (Depo-Provera) injection 150 mg  150 mg intramuscular Once YAW [place]-ALLY         Medications Prior to Admission   Medication Sig Dispense Refill Last Dose/Taking    docusate sodium (Colace) 100 mg capsule Take 1 capsule (100 mg) by mouth 2 times a day as needed for constipation. 90 capsule 2 2025    ferrous sulfate 325 (65 Fe) mg tablet Take 1 tablet (325 mg) by mouth once daily with breakfast. 90 tablet 3 2025    prenatal vitamin, iron-folic, 27 mg iron-800 mcg folic acid tablet Take 1 tablet by mouth once daily. 90 tablet 3 2025    acetaminophen (Tylenol) 500 mg tablet Take 2 tablets (1,000 mg) by mouth every 6 hours if needed for mild pain (1 - 3) for up to 10 days. 30 tablet 0

## 2025-06-06 NOTE — LETTER
June 6, 2025     Patient: Fatou Zambrano   YOB: 2000   Date of Visit: 6/6/2025       To Whom It May Concern:    Fatou Zambrano was seen in OB triage on 6/6/2025.  Please excuse Fatou for her absence from work today.    If you have any questions or concerns, please don't hesitate to call.         Sincerely,       Melida Lutz, YAW-CNP

## 2025-06-06 NOTE — SIGNIFICANT EVENT
To bedside for reassessment. Fatou reports feeling tired. No relief from pain. Still 10/10. Amenable to SVE.    SVE C/L/H  [place] with irritability and reactive NST, monitors removed    35 minutes s/p flexeril, will allow more time for full effectiveness  Consider additional pain medication if no improvement  Discussed no apparent contractions on monitor and reassuring fetal status, closed cervix on exam. Reassurance provided that likely not in PTL. Suspect MSK in nature.      Melissa L Zahorsky, APRN-ALLY

## 2025-06-06 NOTE — TELEPHONE ENCOUNTER
"Received page from Fatou, returned call. She reports having intercourse with partner a little bit ago and began feeling a sharp pain on her right side so they \"didn't finish.\" Then began experiencing cramping like pain in her lower abdomen that she rates 10/10. Cramping occurs every 10-15 minutes. Hasn't tried anything for the pain.     Denies leaking of fluid and vaginal bleeding. Reports good fetal movement, \"baby is moving a lot.\"     Labor precautions and comfort measures reviewed. Patient plans to take tylenol and monitor at home. Advised she is welcome to come in to triage at anytime or can call back with additional questions/concerns. Patient verbalizes understanding.    Melissa L Zahorsky, YAW-ALLY    "

## 2025-06-06 NOTE — SIGNIFICANT EVENT
Report received from M. Zahorsky, CNM.    Pt seen at bedside, resting comfortably.   Pain resolved, declines recheck.  Discussed return precautions.    Dispo: Stable for dc home. Next OB appt 6/17.    Plan and tracing reviewed with Dr. Odom.      Melida Lutz, APRN-CNP     Admission on 06/06/2025   Component Date Value Ref Range Status    Color, UA 06/06/2025 Yellow   In process    Clarity, UA 06/06/2025 Clear   In process    Glucose, UA 06/06/2025 NEGATIVE   In process    Bilirubin, UA 06/06/2025 NEGATIVE   In process    Ketones, UA 06/06/2025 Negative   In process    Spec Grav, UA 06/06/2025 1.015   In process    Blood, UA 06/06/2025 Negative   In process    pH, UA 06/06/2025 6.5   In process    Protein, UA 06/06/2025 NEGATIVE   In process    Urobilinogen, UA 06/06/2025 0.2   In process    Leukocytes, UA 06/06/2025 +   In process    Nitrite, UA 06/06/2025 NEGATIVE   In process

## 2025-06-06 NOTE — LETTER
June 6, 2025     Patient: Fatou Zambrano   YOB: 2000   Date of Visit: 6/6/2025       To Whom It May Concern:    Please excuse Gabriele Sushant from work today as he was in OB triage on 6/6/2025 with his partner who was evaluated for a pregnancy issue.          Sincerely,      Melida Lutz, APRN-CNP

## 2025-06-10 ENCOUNTER — APPOINTMENT (OUTPATIENT)
Dept: INTEGRATIVE MEDICINE | Facility: CLINIC | Age: 25
End: 2025-06-10
Payer: COMMERCIAL

## 2025-06-17 ENCOUNTER — APPOINTMENT (OUTPATIENT)
Dept: OBSTETRICS AND GYNECOLOGY | Facility: HOSPITAL | Age: 25
End: 2025-06-17
Payer: COMMERCIAL

## 2025-06-18 ENCOUNTER — APPOINTMENT (OUTPATIENT)
Dept: OBSTETRICS AND GYNECOLOGY | Facility: HOSPITAL | Age: 25
End: 2025-06-18
Payer: COMMERCIAL

## 2025-06-18 ENCOUNTER — ROUTINE PRENATAL (OUTPATIENT)
Dept: OBSTETRICS AND GYNECOLOGY | Facility: HOSPITAL | Age: 25
End: 2025-06-18
Payer: COMMERCIAL

## 2025-06-18 VITALS — WEIGHT: 172 LBS | SYSTOLIC BLOOD PRESSURE: 100 MMHG | DIASTOLIC BLOOD PRESSURE: 67 MMHG | BODY MASS INDEX: 29.51 KG/M2

## 2025-06-18 DIAGNOSIS — Z3A.29 29 WEEKS GESTATION OF PREGNANCY (HHS-HCC): Primary | ICD-10-CM

## 2025-06-18 DIAGNOSIS — G47.00 INSOMNIA, UNSPECIFIED TYPE: ICD-10-CM

## 2025-06-18 DIAGNOSIS — N89.8 VAGINAL DISCHARGE DURING PREGNANCY IN THIRD TRIMESTER (HHS-HCC): ICD-10-CM

## 2025-06-18 DIAGNOSIS — O26.893 VAGINAL DISCHARGE DURING PREGNANCY IN THIRD TRIMESTER (HHS-HCC): ICD-10-CM

## 2025-06-18 PROCEDURE — 99213 OFFICE O/P EST LOW 20 MIN: CPT | Mod: TH

## 2025-06-18 PROCEDURE — 99213 OFFICE O/P EST LOW 20 MIN: CPT

## 2025-06-18 RX ORDER — TERCONAZOLE 4 MG/G
1 CREAM VAGINAL NIGHTLY
Qty: 45 G | Refills: 0 | Status: SHIPPED | OUTPATIENT
Start: 2025-06-18 | End: 2025-06-25

## 2025-06-18 NOTE — PROGRESS NOTES
Ob Visit  25     SUBJECTIVE      HPI: Fatou Zambrano is a 24 y.o.  at 29w6d here for RPNV.  She has no contractions, no bleeding, or no LOF. Reports normal fetal movement. Patient reports being unable to sleep at night and having vaginal itching; she believes it is a yeast infection.       OBJECTIVE  Visit Vitals  /67   Wt 78 kg (172 lb)   LMP 2024   BMI 29.51 kg/m²   OB Status Pregnant   Smoking Status Never   BSA 1.88 m²            ASSESSMENT & PLAN    Fatou Zambrano is a 24 y.o.  at 29w6d here for the following concerns we addressed today:    Problem List Items Addressed This Visit       29 weeks gestation of pregnancy ([facility]) - Primary    Overview   Desired provider in labor: [] CNM  [] Physician  [x] Blood Products: [x] Yes, accepts [] No, needs counseling  [x] Initial BMI: 26.20  [x] Prenatal Labs:   [x] Cervical Cancer Screening up to date due PP  [x] Rh status: Rh+  [x] Genetic Screening:   RR cf DNA male  [x] NT US: (11-13 wks)  [x] Baby ASA (if indicated):  [x] Pregnancy dated by: LMP    [x] Anatomy US: (19-20 wks) WNL  [x] Federal Sterilization consent signed (if indicated): 2025  [x] 1hr GCT at 24-28wks: 72  [] Rhogam (if indicated): Not applicable  [] Fetal Surveillance (if indicated):  [x] Tdap (27-32 wks, may be given up to 36 wks if initial window missed): declined 2025    [] Breastfeeding:  [x] Postpartum Birth control method: PPTL  [] GBS at 36 - 37 wks:  [] 39 weeks discussion of IOL vs. Expectant management:  [x] Mode of delivery ( anticipated ): rLTCS            Other Visit Diagnoses         Vaginal discharge during pregnancy in third trimester ([facility])        Relevant Medications    terconazole (Terazol 7) 0.4 % vaginal cream    Other Relevant Orders    Vaginitis Gram Stain For Bacterial Vaginosis + Yeast      Insomnia, unspecified type        Relevant Medications    doxylamine (Unisom) 25 mg tablet              RTC in 2 weeks      Radha  YAW Chavez-AGAPITOM

## 2025-06-19 LAB — BV SCORE VAG QL: NORMAL

## 2025-07-02 ENCOUNTER — APPOINTMENT (OUTPATIENT)
Dept: OBSTETRICS AND GYNECOLOGY | Facility: HOSPITAL | Age: 25
End: 2025-07-02
Payer: COMMERCIAL

## 2025-07-02 ENCOUNTER — ROUTINE PRENATAL (OUTPATIENT)
Dept: OBSTETRICS AND GYNECOLOGY | Facility: HOSPITAL | Age: 25
End: 2025-07-02
Payer: COMMERCIAL

## 2025-07-02 VITALS — SYSTOLIC BLOOD PRESSURE: 102 MMHG | BODY MASS INDEX: 30.37 KG/M2 | DIASTOLIC BLOOD PRESSURE: 78 MMHG | WEIGHT: 177 LBS

## 2025-07-02 DIAGNOSIS — Z3A.31 31 WEEKS GESTATION OF PREGNANCY (HHS-HCC): Primary | ICD-10-CM

## 2025-07-02 DIAGNOSIS — O99.891 BACK PAIN AFFECTING PREGNANCY: ICD-10-CM

## 2025-07-02 DIAGNOSIS — M54.9 BACK PAIN AFFECTING PREGNANCY: ICD-10-CM

## 2025-07-02 DIAGNOSIS — O26.899 PAIN OF ROUND LIGAMENT AFFECTING PREGNANCY, ANTEPARTUM (HHS-HCC): ICD-10-CM

## 2025-07-02 DIAGNOSIS — R10.2 PAIN OF ROUND LIGAMENT AFFECTING PREGNANCY, ANTEPARTUM (HHS-HCC): ICD-10-CM

## 2025-07-02 PROCEDURE — 99212 OFFICE O/P EST SF 10 MIN: CPT

## 2025-07-02 PROCEDURE — 99212 OFFICE O/P EST SF 10 MIN: CPT | Mod: TH

## 2025-07-02 RX ORDER — ACETAMINOPHEN 500 MG
1000 TABLET ORAL EVERY 6 HOURS PRN
Qty: 90 TABLET | Refills: 0 | Status: SHIPPED | OUTPATIENT
Start: 2025-07-02 | End: 2025-08-31

## 2025-07-02 NOTE — PROGRESS NOTES
Ob Visit  25     SUBJECTIVE      HPI: Fatou Zambrano is a 24 y.o.  at 31w6d here for RPNV.  She has no contractions, no bleeding, or no LOF. Reports no normal fetal movement. Patient reports lower abdominal pain with intercourse and with changing positions.       OBJECTIVE  Visit Vitals  /78   Wt 80.3 kg (177 lb)   LMP 2024   BMI 30.37 kg/m²   OB Status Pregnant   Smoking Status Never   BSA 1.9 m²            ASSESSMENT & PLAN    Fatou Zambrano is a 24 y.o.  at 31w6d here for the following concerns we addressed today:    Problem List Items Addressed This Visit       31 weeks gestation of pregnancy ([facility]) - Primary    Overview   Desired provider in labor: [] CNM  [] Physician  [x] Blood Products: [x] Yes, accepts [] No, needs counseling  [x] Initial BMI: 26.20  [x] Prenatal Labs:   [x] Cervical Cancer Screening up to date due PP  [x] Rh status: Rh+  [x] Genetic Screening:   RR cf DNA male  [x] NT US: (11-13 wks)  [x] Baby ASA (if indicated):  [x] Pregnancy dated by: LMP    [x] Anatomy US: (19-20 wks) WNL  [x] Federal Sterilization consent signed (if indicated): 2025  [x] 1hr GCT at 24-28wks: 72  [] Rhogam (if indicated): Not applicable  [] Fetal Surveillance (if indicated):  [x] Tdap (27-32 wks, may be given up to 36 wks if initial window missed): declined 2025    [] Breastfeeding:  [x] Postpartum Birth control method: PPTL  [] GBS at 36 - 37 wks:  [] 39 weeks discussion of IOL vs. Expectant management:  [x] Mode of delivery ( anticipated ): rLTCS           Back pain affecting pregnancy    Relevant Medications    acetaminophen (Tylenol) 500 mg tablet    Pain of round ligament affecting pregnancy, antepartum ([facility])    Relevant Medications    acetaminophen (Tylenol) 500 mg tablet         RTC in 2 weeks      Radha Eid, YAW-ALLY

## 2025-07-12 ENCOUNTER — TELEPHONE (OUTPATIENT)
Dept: OBSTETRICS AND GYNECOLOGY | Facility: HOSPITAL | Age: 25
End: 2025-07-12
Payer: COMMERCIAL

## 2025-07-12 ENCOUNTER — HOSPITAL ENCOUNTER (OUTPATIENT)
Facility: HOSPITAL | Age: 25
Discharge: HOME | End: 2025-07-12
Attending: OBSTETRICS & GYNECOLOGY | Admitting: OBSTETRICS & GYNECOLOGY
Payer: COMMERCIAL

## 2025-07-12 ENCOUNTER — APPOINTMENT (OUTPATIENT)
Dept: RADIOLOGY | Facility: HOSPITAL | Age: 25
End: 2025-07-12
Payer: COMMERCIAL

## 2025-07-12 ENCOUNTER — APPOINTMENT (OUTPATIENT)
Dept: OBSTETRICS AND GYNECOLOGY | Facility: HOSPITAL | Age: 25
End: 2025-07-12
Payer: COMMERCIAL

## 2025-07-12 VITALS
RESPIRATION RATE: 18 BRPM | BODY MASS INDEX: 30.37 KG/M2 | DIASTOLIC BLOOD PRESSURE: 57 MMHG | SYSTOLIC BLOOD PRESSURE: 100 MMHG | HEIGHT: 64 IN | TEMPERATURE: 97.3 F | WEIGHT: 177.91 LBS | OXYGEN SATURATION: 99 % | HEART RATE: 73 BPM

## 2025-07-12 LAB
ALBUMIN SERPL BCP-MCNC: 3.7 G/DL (ref 3.4–5)
ALP SERPL-CCNC: 114 U/L (ref 33–110)
ALT SERPL W P-5'-P-CCNC: 6 U/L (ref 7–45)
ANION GAP SERPL CALC-SCNC: 15 MMOL/L (ref 10–20)
AST SERPL W P-5'-P-CCNC: 12 U/L (ref 9–39)
BILIRUB SERPL-MCNC: 0.2 MG/DL (ref 0–1.2)
BILIRUBIN, POC: NEGATIVE
BLOOD URINE, POC: NEGATIVE
BUN SERPL-MCNC: 5 MG/DL (ref 6–23)
CALCIUM SERPL-MCNC: 9.1 MG/DL (ref 8.6–10.6)
CHLORIDE SERPL-SCNC: 102 MMOL/L (ref 98–107)
CLARITY, POC: CLEAR
CO2 SERPL-SCNC: 23 MMOL/L (ref 21–32)
COLOR, POC: YELLOW
CREAT SERPL-MCNC: 0.48 MG/DL (ref 0.5–1.05)
EGFRCR SERPLBLD CKD-EPI 2021: >90 ML/MIN/1.73M*2
ERYTHROCYTE [DISTWIDTH] IN BLOOD BY AUTOMATED COUNT: 12.5 % (ref 11.5–14.5)
FERRITIN SERPL-MCNC: 18 NG/ML
GLUCOSE SERPL-MCNC: 92 MG/DL (ref 74–99)
GLUCOSE URINE, POC: NEGATIVE
HCT VFR BLD AUTO: 30.4 % (ref 36–46)
HGB BLD-MCNC: 9.9 G/DL (ref 12–16)
KETONES, POC: NEGATIVE
LEUKOCYTE EST, POC: NEGATIVE
MCH RBC QN AUTO: 28 PG (ref 26–34)
MCHC RBC AUTO-ENTMCNC: 32.6 G/DL (ref 32–36)
MCV RBC AUTO: 86 FL (ref 80–100)
NITRITE, POC: NEGATIVE
NRBC BLD-RTO: 0 /100 WBCS (ref 0–0)
PH, POC: 6.5
PLATELET # BLD AUTO: 258 X10*3/UL (ref 150–450)
POC APPEARANCE OF BODY FLUID: CLEAR
POTASSIUM SERPL-SCNC: 3.9 MMOL/L (ref 3.5–5.3)
PROT SERPL-MCNC: 6.8 G/DL (ref 6.4–8.2)
RBC # BLD AUTO: 3.54 X10*6/UL (ref 4–5.2)
SODIUM SERPL-SCNC: 136 MMOL/L (ref 136–145)
SPECIFIC GRAVITY, POC: 1
URINE PROTEIN, POC: NEGATIVE
UROBILINOGEN, POC: 0.2
WBC # BLD AUTO: 12.4 X10*3/UL (ref 4.4–11.3)

## 2025-07-12 PROCEDURE — 2500000004 HC RX 250 GENERAL PHARMACY W/ HCPCS (ALT 636 FOR OP/ED): Mod: SE

## 2025-07-12 PROCEDURE — 80053 COMPREHEN METABOLIC PANEL: CPT

## 2025-07-12 PROCEDURE — 76705 ECHO EXAM OF ABDOMEN: CPT | Performed by: STUDENT IN AN ORGANIZED HEALTH CARE EDUCATION/TRAINING PROGRAM

## 2025-07-12 PROCEDURE — 85027 COMPLETE CBC AUTOMATED: CPT

## 2025-07-12 PROCEDURE — 36415 COLL VENOUS BLD VENIPUNCTURE: CPT

## 2025-07-12 PROCEDURE — 59025 FETAL NON-STRESS TEST: CPT

## 2025-07-12 PROCEDURE — 93005 ELECTROCARDIOGRAM TRACING: CPT

## 2025-07-12 PROCEDURE — 76705 ECHO EXAM OF ABDOMEN: CPT

## 2025-07-12 PROCEDURE — 99222 1ST HOSP IP/OBS MODERATE 55: CPT

## 2025-07-12 PROCEDURE — 81002 URINALYSIS NONAUTO W/O SCOPE: CPT

## 2025-07-12 PROCEDURE — 82728 ASSAY OF FERRITIN: CPT

## 2025-07-12 PROCEDURE — 99215 OFFICE O/P EST HI 40 MIN: CPT | Mod: 25

## 2025-07-12 PROCEDURE — 2500000005 HC RX 250 GENERAL PHARMACY W/O HCPCS: Mod: SE

## 2025-07-12 RX ORDER — LIDOCAINE HYDROCHLORIDE 10 MG/ML
0.5 INJECTION, SOLUTION INFILTRATION; PERINEURAL ONCE AS NEEDED
Status: DISCONTINUED | OUTPATIENT
Start: 2025-07-12 | End: 2025-07-12 | Stop reason: HOSPADM

## 2025-07-12 RX ORDER — HYDRALAZINE HYDROCHLORIDE 20 MG/ML
5 INJECTION INTRAMUSCULAR; INTRAVENOUS ONCE AS NEEDED
Status: DISCONTINUED | OUTPATIENT
Start: 2025-07-12 | End: 2025-07-12 | Stop reason: HOSPADM

## 2025-07-12 RX ORDER — ACETAMINOPHEN 325 MG/1
975 TABLET ORAL EVERY 6 HOURS PRN
Status: DISCONTINUED | OUTPATIENT
Start: 2025-07-12 | End: 2025-07-12 | Stop reason: HOSPADM

## 2025-07-12 RX ORDER — LIDOCAINE 560 MG/1
2 PATCH PERCUTANEOUS; TOPICAL; TRANSDERMAL DAILY
Status: DISCONTINUED | OUTPATIENT
Start: 2025-07-12 | End: 2025-07-12

## 2025-07-12 RX ORDER — ONDANSETRON 4 MG/1
4 TABLET, FILM COATED ORAL EVERY 6 HOURS PRN
Status: DISCONTINUED | OUTPATIENT
Start: 2025-07-12 | End: 2025-07-12 | Stop reason: HOSPADM

## 2025-07-12 RX ORDER — LABETALOL HYDROCHLORIDE 5 MG/ML
20 INJECTION, SOLUTION INTRAVENOUS ONCE AS NEEDED
Status: DISCONTINUED | OUTPATIENT
Start: 2025-07-12 | End: 2025-07-12 | Stop reason: HOSPADM

## 2025-07-12 RX ORDER — LIDOCAINE 560 MG/1
2 PATCH PERCUTANEOUS; TOPICAL; TRANSDERMAL DAILY
Status: DISCONTINUED | OUTPATIENT
Start: 2025-07-12 | End: 2025-07-12 | Stop reason: HOSPADM

## 2025-07-12 RX ORDER — ONDANSETRON HYDROCHLORIDE 2 MG/ML
4 INJECTION, SOLUTION INTRAVENOUS EVERY 6 HOURS PRN
Status: DISCONTINUED | OUTPATIENT
Start: 2025-07-12 | End: 2025-07-12 | Stop reason: HOSPADM

## 2025-07-12 RX ADMIN — IRON SUCROSE 200 MG: 20 INJECTION, SOLUTION INTRAVENOUS at 05:21

## 2025-07-12 RX ADMIN — LIDOCAINE 2 PATCH: 4 PATCH TOPICAL at 02:31

## 2025-07-12 SDOH — HEALTH STABILITY: MENTAL HEALTH: SUICIDAL BEHAVIOR (LIFETIME): NO

## 2025-07-12 SDOH — SOCIAL STABILITY: SOCIAL INSECURITY: ARE THERE ANY APPARENT SIGNS OF INJURIES/BEHAVIORS THAT COULD BE RELATED TO ABUSE/NEGLECT?: NO

## 2025-07-12 SDOH — HEALTH STABILITY: MENTAL HEALTH: HAVE YOU USED ANY SUBSTANCES (CANABIS, COCAINE, HEROIN, HALLUCINOGENS, INHALANTS, ETC.) IN THE PAST 12 MONTHS?: NO

## 2025-07-12 SDOH — HEALTH STABILITY: MENTAL HEALTH: WISH TO BE DEAD (PAST 1 MONTH): NO

## 2025-07-12 SDOH — HEALTH STABILITY: MENTAL HEALTH: NON-SPECIFIC ACTIVE SUICIDAL THOUGHTS (PAST 1 MONTH): NO

## 2025-07-12 SDOH — SOCIAL STABILITY: SOCIAL INSECURITY: HAS ANYONE EVER THREATENED TO HURT YOUR FAMILY OR YOUR PETS?: NO

## 2025-07-12 SDOH — SOCIAL STABILITY: SOCIAL INSECURITY: ABUSE SCREEN: ADULT

## 2025-07-12 SDOH — SOCIAL STABILITY: SOCIAL INSECURITY: VERBAL ABUSE: DENIES

## 2025-07-12 SDOH — SOCIAL STABILITY: SOCIAL INSECURITY: DOES ANYONE TRY TO KEEP YOU FROM HAVING/CONTACTING OTHER FRIENDS OR DOING THINGS OUTSIDE YOUR HOME?: NO

## 2025-07-12 SDOH — SOCIAL STABILITY: SOCIAL INSECURITY: DO YOU FEEL ANYONE HAS EXPLOITED OR TAKEN ADVANTAGE OF YOU FINANCIALLY OR OF YOUR PERSONAL PROPERTY?: NO

## 2025-07-12 SDOH — HEALTH STABILITY: MENTAL HEALTH: WERE YOU ABLE TO COMPLETE ALL THE BEHAVIORAL HEALTH SCREENINGS?: YES

## 2025-07-12 SDOH — SOCIAL STABILITY: SOCIAL INSECURITY: ARE YOU OR HAVE YOU BEEN THREATENED OR ABUSED PHYSICALLY, EMOTIONALLY, OR SEXUALLY BY ANYONE?: NO

## 2025-07-12 SDOH — SOCIAL STABILITY: SOCIAL INSECURITY: HAVE YOU HAD ANY THOUGHTS OF HARMING ANYONE ELSE?: NO

## 2025-07-12 SDOH — SOCIAL STABILITY: SOCIAL INSECURITY: PHYSICAL ABUSE: DENIES

## 2025-07-12 SDOH — HEALTH STABILITY: MENTAL HEALTH: HAVE YOU USED ANY PRESCRIPTION DRUGS OTHER THAN PRESCRIBED IN THE PAST 12 MONTHS?: NO

## 2025-07-12 SDOH — ECONOMIC STABILITY: HOUSING INSECURITY: DO YOU FEEL UNSAFE GOING BACK TO THE PLACE WHERE YOU ARE LIVING?: NO

## 2025-07-12 SDOH — SOCIAL STABILITY: SOCIAL INSECURITY: HAVE YOU HAD THOUGHTS OF HARMING ANYONE ELSE?: NO

## 2025-07-12 ASSESSMENT — PAIN SCALES - GENERAL
PAINLEVEL_OUTOF10: 8
PAINLEVEL_OUTOF10: 0 - NO PAIN
PAINLEVEL_OUTOF10: 8

## 2025-07-12 ASSESSMENT — ACTIVITIES OF DAILY LIVING (ADL): LACK_OF_TRANSPORTATION: NO

## 2025-07-12 ASSESSMENT — PATIENT HEALTH QUESTIONNAIRE - PHQ9
2. FEELING DOWN, DEPRESSED OR HOPELESS: NOT AT ALL
1. LITTLE INTEREST OR PLEASURE IN DOING THINGS: NOT AT ALL
SUM OF ALL RESPONSES TO PHQ9 QUESTIONS 1 & 2: 0

## 2025-07-12 ASSESSMENT — LIFESTYLE VARIABLES
HOW MANY STANDARD DRINKS CONTAINING ALCOHOL DO YOU HAVE ON A TYPICAL DAY: PATIENT DOES NOT DRINK
SKIP TO QUESTIONS 9-10: 1
AUDIT-C TOTAL SCORE: 0
HOW OFTEN DO YOU HAVE 6 OR MORE DRINKS ON ONE OCCASION: NEVER
AUDIT-C TOTAL SCORE: 0
HOW OFTEN DO YOU HAVE A DRINK CONTAINING ALCOHOL: NEVER

## 2025-07-12 NOTE — H&P
OB Triage H&P    Assessment/Plan    Fatou Zambrano is a 25 yo  at 33.2 wga by LMP c/w 7 wk US presenting for sharp RUQ pain     Plan      RUQ pain  Sharp RUQ pain 9/10 that only slightly improved with home tylenol @1130 pm. On exam moderately tender in the RUQ with guarding pain, no rebound. Afebrile, hemodynamically stable on RA. Differential is symptomatic gallstones vs appendicitis   - UA neg for LE/nitrites and ketones/bilirubin  - CBC/CMP with WBC 12.4, LFTs wnl  - RUQ US pending   - EKG NSR  - for pain: lidocaine patches, defer tylenol as just took, flexeril offered however pt drove self     Anemia:  - pt taking PO Iron  - Hgb 9.9, ferritin 18  - IV sucrose 200 mg x1 in triage    Contractions  Pt endorsing irregular contractions  - closed/long/high on exam   - toco: acontractile    IUP@33.2 wga   -Fetal monitoring reassuring  -Good fetal movement  -Up to date on prenatal care  -Continue routine prenatal care    Dispo  - signed out to AM team, however if RUQ US findings are normal, can likely be discharged home     Discussed plan and reviewed with: Dr. Lico Crow MD  PGY-2, Obstetrics and Gynecology       Pregnancy Problems (from 24 to present)       Problem Noted Diagnosed Resolved    Pain of round ligament affecting pregnancy, antepartum ([facility]) 2025 by JAYLYN Hills  No    Priority:  Medium       Anxiety 6/3/2025 by JAYLYN Hills  No    Priority:  Medium       Back pain affecting pregnancy 2025 by JAYLYN Fajardo  No    Priority:  Medium       Nausea and vomiting during pregnancy prior to 22 weeks gestation 2025 by JAYLYN Hills  No    Priority:  Medium       Sickle cell trait 1/15/2025 by JAYLYN Hills  No    Priority:  Medium       31 weeks gestation of pregnancy ([facility]) 2024 by JAYLYN Hills  No    Priority:  Medium       Overview Addendum 2025   9:09 AM by JAYLYN Hills   Desired provider in labor: [] CNM  [] Physician  [x] Blood Products: [x] Yes, accepts [] No, needs counseling  [x] Initial BMI: 26.20  [x] Prenatal Labs:   [x] Cervical Cancer Screening up to date due PP  [x] Rh status: Rh+  [x] Genetic Screening:   RR cf DNA male  [x] NT US: (11-13 wks)  [x] Baby ASA (if indicated):  [x] Pregnancy dated by: LMP    [x] Anatomy US: (19-20 wks) WNL  [x] Federal Sterilization consent signed (if indicated): 2025  [x] 1hr GCT at 24-28wks: 72  [] Rhogam (if indicated): Not applicable  [] Fetal Surveillance (if indicated):  [x] Tdap (27-32 wks, may be given up to 36 wks if initial window missed): declined 2025    [] Breastfeeding:  [x] Postpartum Birth control method: PPTL  [] GBS at 36 - 37 wks:  [] 39 weeks discussion of IOL vs. 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 She took two tylenol that improved the pain somewhat but then it worsened. It is 9/10, intermittent pain that lasts for 30 mins when it comes. Has some associated trouble catching her breath, but denies CP or palpitations. Denies fever or chills, HA or vision changes. Denies nausea or vomiting has been eating and drinking normally. Voiding well without dysuria and last BM was today and normal for her.     Also has irregular contractions throughout the day. Denies VB or LOF. Feeling good FM.       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 child (aneurysmal primum atrial  septum)  Anxiety   Anemia Hgb 9.9    Prenatal Provider CNM    OB History    Para Term  AB Living   4 2 2 0 1 2   SAB IAB Ectopic Multiple Live Births   1 0 0 0 2      # Outcome Date GA Lbr Waylon/2nd Weight Sex Type Anes PTL Lv   4 Current            3 Term 23    M CS-LTranv EPI N AB   2 SAB 22           1 Term 21   2.863 kg F CS-LTranv EPI N AB       Surgical History[1]    Social History     Tobacco Use    Smoking status: Never    Smokeless tobacco: Never   Substance Use Topics    Alcohol use: Never       Allergies[2]    Prescriptions Prior to Admission[3]  Objective     Last Vitals  Temp Pulse Resp BP MAP O2 Sat   36 °C (96.8 °F) 101 18 112/67 82 98 %     Blood Pressures         2025  0125             BP: 112/67             Physical Exam  General: NAD, mood appropriate  Cards: RRR  Pulm: CTAB   Abdomen: Gravid, moderately tender to palpation of the RUQ with guarding pain, no rebound pain  Back: no CVA tenderness, tenderness to palpation of the lower abdomen   Extremities: Symmetric  Speculum Exam: deferred  Cervix: Closed /  /      Chaperone Present: Yes.  Chaperone Name/Title: Brittany Brunner  Examination Chaperoned: Gynecological Exam     Fetal Monitoring  Baseline: 140/mod/+accel/-decel  Uterine Activity: No contractions seen on toco  Interpretation: Reactive    Bedside ultrasound: No    Labs in chart were reviewed.          Prenatal labs reviewed, not remarkable.             [1]   Past Surgical History:  Procedure Laterality Date    OTHER SURGICAL HISTORY  2021     section   [2]   Allergies  Allergen Reactions    Amoxicillin Unknown    Other Unknown     Penicillins  Cross Reactors      Penicillin Unknown    Tomato Itching     The patient states her tongue and tonsils itch after eating Tomatoes.    Penicillins Rash   [3]   Medications Prior to Admission   Medication Sig Dispense Refill Last Dose/Taking    acetaminophen (Tylenol) 500 mg tablet Take 2 tablets  (1,000 mg) by mouth every 6 hours if needed for mild pain (1 - 3). 90 tablet 0     docusate sodium (Colace) 100 mg capsule Take 1 capsule (100 mg) by mouth 2 times a day as needed for constipation. 90 capsule 2     doxylamine (Unisom) 25 mg tablet Take 1 tablet (25 mg) by mouth as needed at bedtime for sleep. 60 tablet 0     ferrous sulfate 325 (65 Fe) mg tablet Take 1 tablet (325 mg) by mouth once daily with breakfast. 90 tablet 3     prenatal vitamin, iron-folic, 27 mg iron-800 mcg folic acid tablet Take 1 tablet by mouth once daily. 90 tablet 3

## 2025-07-12 NOTE — TELEPHONE ENCOUNTER
"Received page that \"patient is feeling sharp pain on right side in her ribs and hard to breathe\", confirmed name and . When she got home around , she felt lightheaded. Drank some water and sat in front of the air conditioner. Did not feel much better. Got in the tub. Having sharp pain on right side. Took some tylenol around . Pain is worsening. Tylenol has not relieved the pain she is feeling. Feeling fetal movement. Hard for her to catch her breath and hard to take a deep breath. Denies LOF, vaginal bleeding. Has been having fer fragoso for last 2 days. Advised to come into triage, patient agreeable with pain. Notified triage RN team.     Minda Lucas, YAW-ALLY   "

## 2025-07-15 ENCOUNTER — ROUTINE PRENATAL (OUTPATIENT)
Dept: OBSTETRICS AND GYNECOLOGY | Facility: HOSPITAL | Age: 25
End: 2025-07-15
Payer: COMMERCIAL

## 2025-07-15 VITALS — SYSTOLIC BLOOD PRESSURE: 97 MMHG | BODY MASS INDEX: 30.04 KG/M2 | WEIGHT: 175 LBS | DIASTOLIC BLOOD PRESSURE: 78 MMHG

## 2025-07-15 DIAGNOSIS — O99.613 CONSTIPATION DURING PREGNANCY IN THIRD TRIMESTER (HHS-HCC): ICD-10-CM

## 2025-07-15 DIAGNOSIS — K59.00 CONSTIPATION DURING PREGNANCY IN THIRD TRIMESTER (HHS-HCC): ICD-10-CM

## 2025-07-15 DIAGNOSIS — Z3A.33 33 WEEKS GESTATION OF PREGNANCY (HHS-HCC): Primary | ICD-10-CM

## 2025-07-15 LAB
ATRIAL RATE: 87 BPM
P AXIS: 45 DEGREES
P OFFSET: 197 MS
P ONSET: 146 MS
PR INTERVAL: 152 MS
Q ONSET: 222 MS
QRS COUNT: 14 BEATS
QRS DURATION: 74 MS
QT INTERVAL: 360 MS
QTC CALCULATION(BAZETT): 433 MS
QTC FREDERICIA: 407 MS
R AXIS: 39 DEGREES
T AXIS: 13 DEGREES
T OFFSET: 402 MS
VENTRICULAR RATE: 87 BPM

## 2025-07-15 PROCEDURE — 99212 OFFICE O/P EST SF 10 MIN: CPT | Mod: TH

## 2025-07-15 PROCEDURE — 99212 OFFICE O/P EST SF 10 MIN: CPT

## 2025-07-15 RX ORDER — DOCUSATE SODIUM 100 MG/1
100 CAPSULE, LIQUID FILLED ORAL 2 TIMES DAILY PRN
Qty: 90 CAPSULE | Refills: 2 | Status: SHIPPED | OUTPATIENT
Start: 2025-07-15 | End: 2026-07-15

## 2025-07-15 NOTE — PROGRESS NOTES
Ob Visit  07/15/25     SUBJECTIVE      HPI: Fatou Zambrano is a 24 y.o.  at 33w5d here for RPNV.  She has no contractions, no bleeding, or LOF. Reports normal fetal movement. Patient reports no concerns.       OBJECTIVE  Visit Vitals  BP 97/78   Wt 79.4 kg (175 lb)   LMP 2024   BMI 30.04 kg/m²   OB Status Pregnant   Smoking Status Never   BSA 1.89 m²            ASSESSMENT & PLAN    Fatou Zambrano is a 24 y.o.  at 33w5d here for the following concerns we addressed today:    Problem List Items Addressed This Visit       33 weeks gestation of pregnancy ([facility]) - Primary    Overview   Desired provider in labor: [] CNM  [] Physician  [x] Blood Products: [x] Yes, accepts [] No, needs counseling  [x] Initial BMI: 26.20  [x] Prenatal Labs:   [x] Cervical Cancer Screening up to date due PP  [x] Rh status: Rh+  [x] Genetic Screening:   RR cf DNA male  [x] NT US: (11-13 wks)  [x] Baby ASA (if indicated):  [x] Pregnancy dated by: LMP    [x] Anatomy US: (19-20 wks) WNL  [x] Federal Sterilization consent signed (if indicated): 2025  [x] 1hr GCT at 24-28wks: 72  [] Rhogam (if indicated): Not applicable  [] Fetal Surveillance (if indicated):  [x] Tdap (27-32 wks, may be given up to 36 wks if initial window missed): declined 2025    [] Breastfeeding:  [x] Postpartum Birth control method: PPTL  [] GBS at 36 - 37 wks:  [] 39 weeks discussion of IOL vs. Expectant management:  [x] Mode of delivery ( anticipated ): rLTCS            Other Visit Diagnoses         Constipation during pregnancy in third trimester ([facility])        Relevant Medications    docusate sodium (Colace) 100 mg capsule              RTC in 2 weeks      YAW Hills-ALLY

## 2025-07-17 ENCOUNTER — TELEPHONE (OUTPATIENT)
Dept: OBSTETRICS AND GYNECOLOGY | Facility: HOSPITAL | Age: 25
End: 2025-07-17
Payer: COMMERCIAL

## 2025-07-17 ENCOUNTER — HOSPITAL ENCOUNTER (OUTPATIENT)
Facility: HOSPITAL | Age: 25
Discharge: HOME | End: 2025-07-17
Attending: OBSTETRICS & GYNECOLOGY | Admitting: OBSTETRICS & GYNECOLOGY
Payer: COMMERCIAL

## 2025-07-17 VITALS
OXYGEN SATURATION: 98 % | RESPIRATION RATE: 16 BRPM | WEIGHT: 179.68 LBS | DIASTOLIC BLOOD PRESSURE: 69 MMHG | TEMPERATURE: 97.2 F | HEIGHT: 64 IN | HEART RATE: 88 BPM | BODY MASS INDEX: 30.67 KG/M2 | SYSTOLIC BLOOD PRESSURE: 128 MMHG

## 2025-07-17 DIAGNOSIS — E55.9 VITAMIN D DEFICIENCY: Primary | ICD-10-CM

## 2025-07-17 LAB
25(OH)D3 SERPL-MCNC: 26 NG/ML (ref 30–100)
ALBUMIN SERPL BCP-MCNC: 3.7 G/DL (ref 3.4–5)
ALP SERPL-CCNC: 115 U/L (ref 33–110)
ALT SERPL W P-5'-P-CCNC: 6 U/L (ref 7–45)
ANION GAP SERPL CALC-SCNC: 14 MMOL/L (ref 10–20)
AST SERPL W P-5'-P-CCNC: 17 U/L (ref 9–39)
BILIRUB SERPL-MCNC: 0.3 MG/DL (ref 0–1.2)
BUN SERPL-MCNC: 5 MG/DL (ref 6–23)
CALCIUM SERPL-MCNC: 8.7 MG/DL (ref 8.6–10.6)
CHLORIDE SERPL-SCNC: 104 MMOL/L (ref 98–107)
CO2 SERPL-SCNC: 23 MMOL/L (ref 21–32)
CREAT SERPL-MCNC: 0.48 MG/DL (ref 0.5–1.05)
EGFRCR SERPLBLD CKD-EPI 2021: >90 ML/MIN/1.73M*2
ERYTHROCYTE [DISTWIDTH] IN BLOOD BY AUTOMATED COUNT: 13.2 % (ref 11.5–14.5)
GLUCOSE BLD MANUAL STRIP-MCNC: 79 MG/DL (ref 74–99)
GLUCOSE SERPL-MCNC: 75 MG/DL (ref 74–99)
HCT VFR BLD AUTO: 29.9 % (ref 36–46)
HGB BLD-MCNC: 9.9 G/DL (ref 12–16)
MCH RBC QN AUTO: 28.2 PG (ref 26–34)
MCHC RBC AUTO-ENTMCNC: 33.1 G/DL (ref 32–36)
MCV RBC AUTO: 85 FL (ref 80–100)
NRBC BLD-RTO: 0 /100 WBCS (ref 0–0)
PLATELET # BLD AUTO: 239 X10*3/UL (ref 150–450)
POC APPEARANCE, URINE: CLEAR
POC BILIRUBIN, URINE: NEGATIVE
POC BLOOD, URINE: ABNORMAL
POC COLOR, URINE: YELLOW
POC GLUCOSE, URINE: NEGATIVE MG/DL
POC KETONES, URINE: NEGATIVE MG/DL
POC LEUKOCYTES, URINE: NEGATIVE
POC NITRITE,URINE: NEGATIVE
POC PH, URINE: 7 PH
POC PROTEIN, URINE: NEGATIVE MG/DL
POC SPECIFIC GRAVITY, URINE: 1.02
POC UROBILINOGEN, URINE: 0.2 EU/DL
POTASSIUM SERPL-SCNC: 4.1 MMOL/L (ref 3.5–5.3)
PROT SERPL-MCNC: 6.6 G/DL (ref 6.4–8.2)
RBC # BLD AUTO: 3.51 X10*6/UL (ref 4–5.2)
SODIUM SERPL-SCNC: 137 MMOL/L (ref 136–145)
WBC # BLD AUTO: 9.5 X10*3/UL (ref 4.4–11.3)

## 2025-07-17 PROCEDURE — 82306 VITAMIN D 25 HYDROXY: CPT | Performed by: STUDENT IN AN ORGANIZED HEALTH CARE EDUCATION/TRAINING PROGRAM

## 2025-07-17 PROCEDURE — 99215 OFFICE O/P EST HI 40 MIN: CPT | Mod: 25

## 2025-07-17 PROCEDURE — 85027 COMPLETE CBC AUTOMATED: CPT | Performed by: STUDENT IN AN ORGANIZED HEALTH CARE EDUCATION/TRAINING PROGRAM

## 2025-07-17 PROCEDURE — 2500000004 HC RX 250 GENERAL PHARMACY W/ HCPCS (ALT 636 FOR OP/ED): Mod: SE | Performed by: STUDENT IN AN ORGANIZED HEALTH CARE EDUCATION/TRAINING PROGRAM

## 2025-07-17 PROCEDURE — 59025 FETAL NON-STRESS TEST: CPT | Performed by: STUDENT IN AN ORGANIZED HEALTH CARE EDUCATION/TRAINING PROGRAM

## 2025-07-17 PROCEDURE — 80053 COMPREHEN METABOLIC PANEL: CPT | Performed by: STUDENT IN AN ORGANIZED HEALTH CARE EDUCATION/TRAINING PROGRAM

## 2025-07-17 PROCEDURE — 36415 COLL VENOUS BLD VENIPUNCTURE: CPT | Performed by: STUDENT IN AN ORGANIZED HEALTH CARE EDUCATION/TRAINING PROGRAM

## 2025-07-17 PROCEDURE — 36415 COLL VENOUS BLD VENIPUNCTURE: CPT

## 2025-07-17 PROCEDURE — 82947 ASSAY GLUCOSE BLOOD QUANT: CPT

## 2025-07-17 PROCEDURE — 99214 OFFICE O/P EST MOD 30 MIN: CPT | Performed by: STUDENT IN AN ORGANIZED HEALTH CARE EDUCATION/TRAINING PROGRAM

## 2025-07-17 RX ORDER — CHOLECALCIFEROL (VITAMIN D3) 25 MCG
25 TABLET ORAL DAILY
Qty: 30 TABLET | Refills: 2 | Status: SHIPPED | OUTPATIENT
Start: 2025-07-17 | End: 2025-10-15

## 2025-07-17 RX ADMIN — IRON SUCROSE 200 MG: 20 INJECTION, SOLUTION INTRAVENOUS at 20:28

## 2025-07-17 SDOH — HEALTH STABILITY: MENTAL HEALTH: WISH TO BE DEAD (PAST 1 MONTH): NO

## 2025-07-17 SDOH — SOCIAL STABILITY: SOCIAL INSECURITY: ARE THERE ANY APPARENT SIGNS OF INJURIES/BEHAVIORS THAT COULD BE RELATED TO ABUSE/NEGLECT?: NO

## 2025-07-17 SDOH — SOCIAL STABILITY: SOCIAL INSECURITY: HAVE YOU HAD ANY THOUGHTS OF HARMING ANYONE ELSE?: NO

## 2025-07-17 SDOH — SOCIAL STABILITY: SOCIAL INSECURITY: ABUSE SCREEN: ADULT

## 2025-07-17 SDOH — HEALTH STABILITY: MENTAL HEALTH: HAVE YOU USED ANY PRESCRIPTION DRUGS OTHER THAN PRESCRIBED IN THE PAST 12 MONTHS?: NO

## 2025-07-17 SDOH — SOCIAL STABILITY: SOCIAL INSECURITY: VERBAL ABUSE: DENIES

## 2025-07-17 SDOH — HEALTH STABILITY: MENTAL HEALTH: HAVE YOU USED ANY SUBSTANCES (CANABIS, COCAINE, HEROIN, HALLUCINOGENS, INHALANTS, ETC.) IN THE PAST 12 MONTHS?: NO

## 2025-07-17 SDOH — SOCIAL STABILITY: SOCIAL INSECURITY: ARE YOU OR HAVE YOU BEEN THREATENED OR ABUSED PHYSICALLY, EMOTIONALLY, OR SEXUALLY BY ANYONE?: NO

## 2025-07-17 SDOH — SOCIAL STABILITY: SOCIAL INSECURITY: PHYSICAL ABUSE: DENIES

## 2025-07-17 SDOH — SOCIAL STABILITY: SOCIAL INSECURITY: DOES ANYONE TRY TO KEEP YOU FROM HAVING/CONTACTING OTHER FRIENDS OR DOING THINGS OUTSIDE YOUR HOME?: NO

## 2025-07-17 SDOH — ECONOMIC STABILITY: HOUSING INSECURITY: DO YOU FEEL UNSAFE GOING BACK TO THE PLACE WHERE YOU ARE LIVING?: NO

## 2025-07-17 SDOH — HEALTH STABILITY: MENTAL HEALTH: NON-SPECIFIC ACTIVE SUICIDAL THOUGHTS (PAST 1 MONTH): NO

## 2025-07-17 SDOH — HEALTH STABILITY: MENTAL HEALTH: SUICIDAL BEHAVIOR (LIFETIME): NO

## 2025-07-17 SDOH — HEALTH STABILITY: MENTAL HEALTH: WERE YOU ABLE TO COMPLETE ALL THE BEHAVIORAL HEALTH SCREENINGS?: YES

## 2025-07-17 SDOH — SOCIAL STABILITY: SOCIAL INSECURITY: HAVE YOU HAD THOUGHTS OF HARMING ANYONE ELSE?: NO

## 2025-07-17 SDOH — SOCIAL STABILITY: SOCIAL INSECURITY: DO YOU FEEL ANYONE HAS EXPLOITED OR TAKEN ADVANTAGE OF YOU FINANCIALLY OR OF YOUR PERSONAL PROPERTY?: NO

## 2025-07-17 SDOH — SOCIAL STABILITY: SOCIAL INSECURITY: HAS ANYONE EVER THREATENED TO HURT YOUR FAMILY OR YOUR PETS?: NO

## 2025-07-17 ASSESSMENT — LIFESTYLE VARIABLES
AUDIT-C TOTAL SCORE: 0
HOW OFTEN DO YOU HAVE 6 OR MORE DRINKS ON ONE OCCASION: NEVER
AUDIT-C TOTAL SCORE: 0
HOW OFTEN DO YOU HAVE A DRINK CONTAINING ALCOHOL: NEVER
HOW MANY STANDARD DRINKS CONTAINING ALCOHOL DO YOU HAVE ON A TYPICAL DAY: PATIENT DOES NOT DRINK
SKIP TO QUESTIONS 9-10: 1

## 2025-07-17 ASSESSMENT — PATIENT HEALTH QUESTIONNAIRE - PHQ9
1. LITTLE INTEREST OR PLEASURE IN DOING THINGS: NOT AT ALL
SUM OF ALL RESPONSES TO PHQ9 QUESTIONS 1 & 2: 0
2. FEELING DOWN, DEPRESSED OR HOPELESS: NOT AT ALL

## 2025-07-17 ASSESSMENT — PAIN SCALES - GENERAL: PAINLEVEL_OUTOF10: 0 - NO PAIN

## 2025-07-17 NOTE — H&P
OB Triage H&P    Assessment/Plan    Fatou Zambrano is a 24 y.o.  at 34w0d, LILLIAM: 2025, by Last Menstrual Period, who presents to triage with dizziness.    Plan    Dizziness  - 2 day history of light-headedness, worse with movement   - EKG sinus rhythm with PACs  - orthostatics negative   - CBC notable for anemia (see below)  - CMP unremarkable   - Vitamin D low, will send rx for supplementation   - suspect symptomatic anemia as cause of symptoms   - return precautions reviewed, can consider cardiology referral and Holter if worsening of symptoms    Anemia  - hgb 9.9 today   - IV Fe 200 mg push given in triage     R/o PTL  - contractions every 3-6 minutes on toco   - SVE closed/long/hi  - low suspicion for PTL, pt not feeling ctxs    Maternal Well-being  - Vital signs stable and WNL  - Emotional support and reassurance provided  - All questions and concerns addressed    IUP @ 34w0d  - prenatal lab work reviewed   - normal 1 hr gtt  - NST reactive  - GBS to be collected at next visit   - continue routine prenatal care     Dispo  -Patient appropriate for discharge home, agrees with plan  -Return precautions discussed   -Follow up at next scheduled OB appointment or to triage sooner as needed    Discussed plan and reviewed with: Dr. Ricki Mann PA-C  25 11:06 PM  Vocera    Pregnancy Problems (from 24 to present)       Problem Noted Diagnosed Resolved    Pain of round ligament affecting pregnancy, antepartum ([facility] 2025 by JAYLYN Hills  No    Priority:  Medium       Anxiety 6/3/2025 by JAYLYN Hills  No    Priority:  Medium       Back pain affecting pregnancy 2025 by JAYLYN Fajardo  No    Priority:  Medium       Nausea and vomiting during pregnancy prior to 22 weeks gestation 2025 by JAYLYN Hills  No    Priority:  Medium       Sickle cell trait 1/15/2025 by JAYLYN Hills  No     Priority:  Medium       33 weeks gestation of pregnancy ([facility]) 2024 by JAYLYN Hills  No    Priority:  Medium       Overview Addendum 2025  9:09 AM by JAYLYN Hills   Desired provider in labor: [] CNKATERYNA  [] Physician  [x] Blood Products: [x] Yes, accepts [] No, needs counseling  [x] Initial BMI: 26.20  [x] Prenatal Labs:   [x] Cervical Cancer Screening up to date due PP  [x] Rh status: Rh+  [x] Genetic Screening:   RR cf DNA male  [x] NT US: (11-13 wks)  [x] Baby ASA (if indicated):  [x] Pregnancy dated by: LMP    [x] Anatomy US: (19-20 wks) WNL  [x] Federal Sterilization consent signed (if indicated): 2025  [x] 1hr GCT at 24-28wks: 72  [] Rhogam (if indicated): Not applicable  [] Fetal Surveillance (if indicated):  [x] Tdap (27-32 wks, may be given up to 36 wks if initial window missed): declined 2025    [] Breastfeeding:  [x] Postpartum Birth control method: PPTL  [] GBS at 36 - 37 wks:  [] 39 weeks discussion of IOL vs. 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  at 34w0d by LMP who presents to triage for dizziness. For the last 2 days she has been feeling light-headed. States it is present all the time but worse with movement and she feels like she is going to pass out when she is standing for long periods of time. Denies any syncopal events. States she experiences light-headedness at rest but it is less severe than with movement. Denies HA, vision changes, nausea, or vomiting. She is anemic and reports taking iron daily. Denies CP, SOB, or palpitations. States she never had anything like this in previous pregnancies. Denies VB, LOF, or  ctxs. Reports good FM.    Prenatal Provider Radha Eid    OB History    Para Term  AB Living   4 2 2 0 1 2   SAB IAB Ectopic Multiple Live Births   1 0 0 0 2      # Outcome Date GA Lbr Waylon/2nd Weight Sex Type Anes PTL Lv   4 Current            3 Term 23    M CS-LTranv EPI N AB   2 SAB 22           1 Term 21   2.863 kg F CS-LTranv EPI N AB       Surgical History[1]    Social History     Tobacco Use    Smoking status: Never    Smokeless tobacco: Never   Substance Use Topics    Alcohol use: Never       Allergies[2]    Prescriptions Prior to Admission[3]  Objective     Last Vitals  Temp Pulse Resp BP MAP O2 Sat   36.2 °C (97.2 °F) 88 16 128/69 90 98 %     Blood Pressures         2025  1754 2025  1808 2025  1810 2025  1812 2025  2151    BP: 102/67 104/68 109/76 111/75 128/69             Physical Exam  General: NAD, mood appropriate  Cardiopulmonary: warm and well perfused, breathing comfortably on room air  Abdomen: Gravid, non-tender  Extremities: Symmetric  Cervix: Closed /0 /-3     Chaperone Present: Yes.  Chaperone Name/Title: Lois Renteria RN  Examination Chaperoned: Gynecological Exam     Fetal Monitoring  Baseline: 135 bpm, Variability: moderate,  Accelerations: present and Decelerations: none  Uterine Activity: q3-6 minutes  Interpretation: Reactive    Admission on 2025, Discharged on 2025   Component Date Value Ref Range Status    POCT Glucose 2025 79  74 - 99 mg/dL Final    POC Color, Urine 2025 Yellow  Straw, Yellow, Light-Yellow In process    POC Appearance, Urine 2025 Clear  Clear In process    POC Glucose, Urine 2025 NEGATIVE  NEGATIVE mg/dl In process    POC Bilirubin, Urine 2025 NEGATIVE  NEGATIVE In process    POC Ketones, Urine 2025 NEGATIVE  NEGATIVE mg/dl In process    POC Specific Gravity, Urine 2025 1.020  1.005 - 1.035 In process    POC Blood, Urine 2025 TRACE-Intact (A)   NEGATIVE In process    POC PH, Urine 07/17/2025 7.0  No Reference Range Established PH In process    POC Protein, Urine 07/17/2025 NEGATIVE  NEGATIVE mg/dl In process    POC Urobilinogen, Urine 07/17/2025 0.2  0.2, 1.0 EU/DL In process    Poc Nitrite, Urine 07/17/2025 NEGATIVE  NEGATIVE In process    POC Leukocytes, Urine 07/17/2025 NEGATIVE  NEGATIVE In process    WBC 07/17/2025 9.5  4.4 - 11.3 x10*3/uL Final    nRBC 07/17/2025 0.0  0.0 - 0.0 /100 WBCs Final    RBC 07/17/2025 3.51 (L)  4.00 - 5.20 x10*6/uL Final    Hemoglobin 07/17/2025 9.9 (L)  12.0 - 16.0 g/dL Final    Hematocrit 07/17/2025 29.9 (L)  36.0 - 46.0 % Final    MCV 07/17/2025 85  80 - 100 fL Final    MCH 07/17/2025 28.2  26.0 - 34.0 pg Final    MCHC 07/17/2025 33.1  32.0 - 36.0 g/dL Final    RDW 07/17/2025 13.2  11.5 - 14.5 % Final    Platelets 07/17/2025 239  150 - 450 x10*3/uL Final    Glucose 07/17/2025 75  74 - 99 mg/dL Final    Sodium 07/17/2025 137  136 - 145 mmol/L Final    Potassium 07/17/2025 4.1  3.5 - 5.3 mmol/L Final    Chloride 07/17/2025 104  98 - 107 mmol/L Final    Bicarbonate 07/17/2025 23  21 - 32 mmol/L Final    Anion Gap 07/17/2025 14  10 - 20 mmol/L Final    Urea Nitrogen 07/17/2025 5 (L)  6 - 23 mg/dL Final    Creatinine 07/17/2025 0.48 (L)  0.50 - 1.05 mg/dL Final    eGFR 07/17/2025 >90  >60 mL/min/1.73m*2 Final    Calculations of estimated GFR are performed using the 2021 CKD-EPI Study Refit equation without the race variable for the IDMS-Traceable creatinine methods.  https://jasn.asnjournals.org/content/early/2021/09/22/ASN.9204422710    Calcium 07/17/2025 8.7  8.6 - 10.6 mg/dL Final    Albumin 07/17/2025 3.7  3.4 - 5.0 g/dL Final    Alkaline Phosphatase 07/17/2025 115 (H)  33 - 110 U/L Final    Total Protein 07/17/2025 6.6  6.4 - 8.2 g/dL Final    AST 07/17/2025 17  9 - 39 U/L Final    Bilirubin, Total 07/17/2025 0.3  0.0 - 1.2 mg/dL Final    ALT 07/17/2025 6 (L)  7 - 45 U/L Final    Patients treated with Sulfasalazine may  generate falsely decreased results for ALT.    Vitamin D, 25-Hydroxy, Total 2025 26 (L)  30 - 100 ng/mL Final                  [1]   Past Surgical History:  Procedure Laterality Date    OTHER SURGICAL HISTORY  2021     section   [2]   Allergies  Allergen Reactions    Amoxicillin Unknown    Other Unknown     Penicillins  Cross Reactors      Penicillin Unknown    Tomato Itching     The patient states her tongue and tonsils itch after eating Tomatoes.    Penicillins Rash   [3]   No medications prior to admission.

## 2025-07-17 NOTE — TELEPHONE ENCOUNTER
Nurse returned pt call, pt phone number unable to receive calls. Will follow up with pt via [employer].

## 2025-07-18 ENCOUNTER — TELEPHONE (OUTPATIENT)
Dept: OBSTETRICS AND GYNECOLOGY | Facility: HOSPITAL | Age: 25
End: 2025-07-18
Payer: COMMERCIAL

## 2025-07-18 DIAGNOSIS — O47.9 BRAXTON HICKS CONTRACTIONS (HHS-HCC): Primary | ICD-10-CM

## 2025-07-18 RX ORDER — CYCLOBENZAPRINE HCL 5 MG
5 TABLET ORAL 3 TIMES DAILY PRN
Qty: 30 TABLET | Refills: 0 | Status: SHIPPED | OUTPATIENT
Start: 2025-07-18 | End: 2025-08-17

## 2025-07-19 NOTE — TELEPHONE ENCOUNTER
"DELAYED ENTRY; CALL RETURNED TO PATIENT AT 1822:    24 year old  at 34.1 weeks by LMP calling for concerns of continuing contractions from her triage visit yesterday.  She presented to L&D triage on 2025 for dizziness occurring for the last 2 days.  Hemoglobin 9.9 during visit; 200mg iron given IV push.  She had irregular contractions on the monitor.  SVE closed at that time.  Patient states the contractions have remained the same since yesterday.  Denies vaginal bleeding, LOF.  Endorses good fetal movement.  Patient states she has attempted a bath yesterday which \"kind of helped\" and \"Tylenol doesn't really work for me anymore.\"  Suggested heating pad for more continuous heat application, to continue warm baths, hydration and getting a support belt to assist with discomforts; patient agreeable to plan and willing to try.  Also offered Flexeril for comfort while outpatient to assist; patient agreeable.  Rx sent to pharmacy.    Patient also has concerns regarding when the contractions may possibly begin to dilate her cervix.  Reassured patient that, if contractions she is experiencing have remained the same in intensity and consistency, there would be minimal, if any, change to her cervical exam at this time; patient verbalized understanding and felt reassured.  Strict return precautions provided.  All questions answered.    No other concerns.    Radha Eid, YAW-ALLY      "

## 2025-07-21 DIAGNOSIS — N94.9 ROUND LIGAMENT PAIN: Primary | ICD-10-CM

## 2025-07-21 RX ORDER — DIPHENHYDRAMINE HCL 25 MG
25 CAPSULE ORAL NIGHTLY PRN
Qty: 90 CAPSULE | Refills: 0 | Status: SHIPPED | OUTPATIENT
Start: 2025-07-21 | End: 2025-10-19

## 2025-07-27 ENCOUNTER — TELEPHONE (OUTPATIENT)
Dept: OBSTETRICS AND GYNECOLOGY | Facility: HOSPITAL | Age: 25
End: 2025-07-27
Payer: COMMERCIAL

## 2025-07-28 NOTE — TELEPHONE ENCOUNTER
"Received page from Fatou, returned called. She reports she was sitting on her bed playing on TikTok tonight when she began feeling a sharp pain in her lower belly \"like somebody stabbed me.\" The pain lasts about 2-3 minutes when she feels it and then it goes away. Comes back again \"maybe 6 minutes\" later. The pain makes her feel like she wants to cry.    She has not taken anything for the pain, although currently in hot shower to try to help pain because she doesn't have a heating pad.     She denies vaginal bleeding, leaking of fluid, contractions. She reports that she has been having contractions intermittently since her last triage visit on 7/17. None currently. States she received a prescription for flexeril for when she gets contractions and the contractions subside after taking.     The sharp pain she is feeling now is a different pain that she hasn't felt before. However, reports she felt this pain in early pregnancy with her P2 pregnancy and it resolved.    She denies dysuria, frequency, urgency. Denies vaginal discharge, odor, itching, irritation. Endorses adequate nutrition/hydration today. No recent intercourse.    Discussed likely MSK in nature. Encouraged Fatou to take tylenol and flexeril and try to rest for the night to see if symptoms go away. Labor precautions reviewed. Advised to call/come in if symptoms worsen/do not improve. Fatou is amenable to plan. All questions and concerns addressed at this time.    Melissa L Zahorsky, YAW-ALLY      "

## 2025-07-29 ENCOUNTER — HOSPITAL ENCOUNTER (OUTPATIENT)
Facility: HOSPITAL | Age: 25
Setting detail: SURGERY ADMIT
End: 2025-07-29
Attending: SPECIALIST | Admitting: SPECIALIST
Payer: COMMERCIAL

## 2025-07-29 ENCOUNTER — APPOINTMENT (OUTPATIENT)
Dept: OBSTETRICS AND GYNECOLOGY | Facility: HOSPITAL | Age: 25
End: 2025-07-29
Payer: COMMERCIAL

## 2025-07-29 VITALS — BODY MASS INDEX: 31.27 KG/M2 | DIASTOLIC BLOOD PRESSURE: 67 MMHG | WEIGHT: 182.2 LBS | SYSTOLIC BLOOD PRESSURE: 106 MMHG

## 2025-07-29 DIAGNOSIS — Z3A.25 25 WEEKS GESTATION OF PREGNANCY (HHS-HCC): ICD-10-CM

## 2025-07-29 DIAGNOSIS — Z3A.35 35 WEEKS GESTATION OF PREGNANCY (HHS-HCC): Primary | ICD-10-CM

## 2025-07-29 DIAGNOSIS — Z98.891 PREVIOUS CESAREAN SECTION: ICD-10-CM

## 2025-07-29 PROCEDURE — 99212 OFFICE O/P EST SF 10 MIN: CPT

## 2025-07-29 PROCEDURE — 99212 OFFICE O/P EST SF 10 MIN: CPT | Mod: TH

## 2025-07-29 NOTE — PROGRESS NOTES
Ob Visit  25     SUBJECTIVE      HPI: Fatou Zambrano is a 25 y.o.  at 35w5d here for RPNV.  She has rare contractions, no bleeding, or no LOF. Reports normal fetal movement. Patient reports no concerns.       OBJECTIVE  Visit Vitals  /67   Wt 82.6 kg (182 lb 3.2 oz)   LMP 2024   BMI 31.27 kg/m²   OB Status Pregnant   Smoking Status Never   BSA 1.93 m²            ASSESSMENT & PLAN    Fatou Zambrano is a 25 y.o.  at 35w5d here for the following concerns we addressed today:    Problem List Items Addressed This Visit       35 weeks gestation of pregnancy ([facility]) - Primary    Overview   Desired provider in labor: [] CNM  [] Physician  [x] Blood Products: [x] Yes, accepts [] No, needs counseling  [x] Initial BMI: 26.20  [x] Prenatal Labs:   [x] Cervical Cancer Screening up to date due PP  [x] Rh status: Rh+  [x] Genetic Screening:   RR cf DNA male  [x] NT US: (11-13 wks)  [x] Baby ASA (if indicated):  [x] Pregnancy dated by: LMP    [x] Anatomy US: (19-20 wks) WNL  [x] Federal Sterilization consent signed (if indicated): 2025  [x] 1hr GCT at 24-28wks: 72  [] Rhogam (if indicated): Not applicable  [] Fetal Surveillance (if indicated):  [x] Tdap (27-32 wks, may be given up to 36 wks if initial window missed): declined 2025    [] Breastfeeding:  [x] Postpartum Birth control method: PPTL  [] GBS at 36 - 37 wks:  [] 39 weeks discussion of IOL vs. Expectant management:  [x] Mode of delivery ( anticipated ): rLTCS           Relevant Medications    prenatal vitamin, iron-folic, 27 mg iron-800 mcg folic acid tablet     Other Visit Diagnoses         25 weeks gestation of pregnancy ([facility])        Relevant Medications    prenatal vitamin, iron-folic, 27 mg iron-800 mcg folic acid tablet              RTC in 1 week      JAYLYN Hills

## 2025-07-31 ENCOUNTER — HOSPITAL ENCOUNTER (OUTPATIENT)
Facility: HOSPITAL | Age: 25
Discharge: HOME | End: 2025-07-31
Attending: OBSTETRICS & GYNECOLOGY | Admitting: OBSTETRICS & GYNECOLOGY
Payer: COMMERCIAL

## 2025-07-31 ENCOUNTER — PATIENT MESSAGE (OUTPATIENT)
Dept: OBSTETRICS AND GYNECOLOGY | Facility: HOSPITAL | Age: 25
End: 2025-07-31
Payer: COMMERCIAL

## 2025-07-31 ENCOUNTER — TELEPHONE (OUTPATIENT)
Dept: OBSTETRICS AND GYNECOLOGY | Facility: HOSPITAL | Age: 25
End: 2025-07-31
Payer: COMMERCIAL

## 2025-07-31 VITALS
HEIGHT: 64 IN | OXYGEN SATURATION: 100 % | TEMPERATURE: 97.5 F | HEART RATE: 107 BPM | SYSTOLIC BLOOD PRESSURE: 110 MMHG | DIASTOLIC BLOOD PRESSURE: 68 MMHG | RESPIRATION RATE: 18 BRPM | WEIGHT: 184.75 LBS | BODY MASS INDEX: 31.54 KG/M2

## 2025-07-31 PROCEDURE — 59025 FETAL NON-STRESS TEST: CPT

## 2025-07-31 PROCEDURE — 99213 OFFICE O/P EST LOW 20 MIN: CPT

## 2025-07-31 PROCEDURE — 99214 OFFICE O/P EST MOD 30 MIN: CPT

## 2025-07-31 RX ORDER — ONDANSETRON 4 MG/1
4 TABLET, FILM COATED ORAL EVERY 6 HOURS PRN
Status: DISCONTINUED | OUTPATIENT
Start: 2025-07-31 | End: 2025-07-31 | Stop reason: HOSPADM

## 2025-07-31 RX ORDER — ONDANSETRON HYDROCHLORIDE 2 MG/ML
4 INJECTION, SOLUTION INTRAVENOUS EVERY 6 HOURS PRN
Status: DISCONTINUED | OUTPATIENT
Start: 2025-07-31 | End: 2025-07-31 | Stop reason: HOSPADM

## 2025-07-31 SDOH — ECONOMIC STABILITY: HOUSING INSECURITY: DO YOU FEEL UNSAFE GOING BACK TO THE PLACE WHERE YOU ARE LIVING?: NO

## 2025-07-31 SDOH — HEALTH STABILITY: MENTAL HEALTH: WISH TO BE DEAD (PAST 1 MONTH): NO

## 2025-07-31 SDOH — SOCIAL STABILITY: SOCIAL INSECURITY: HAVE YOU HAD THOUGHTS OF HARMING ANYONE ELSE?: NO

## 2025-07-31 SDOH — HEALTH STABILITY: MENTAL HEALTH: NON-SPECIFIC ACTIVE SUICIDAL THOUGHTS (PAST 1 MONTH): NO

## 2025-07-31 SDOH — HEALTH STABILITY: MENTAL HEALTH: SUICIDAL BEHAVIOR (LIFETIME): NO

## 2025-07-31 SDOH — SOCIAL STABILITY: SOCIAL INSECURITY: PHYSICAL ABUSE: DENIES

## 2025-07-31 SDOH — SOCIAL STABILITY: SOCIAL INSECURITY: DO YOU FEEL ANYONE HAS EXPLOITED OR TAKEN ADVANTAGE OF YOU FINANCIALLY OR OF YOUR PERSONAL PROPERTY?: NO

## 2025-07-31 SDOH — SOCIAL STABILITY: SOCIAL INSECURITY: VERBAL ABUSE: DENIES

## 2025-07-31 SDOH — SOCIAL STABILITY: SOCIAL INSECURITY: ABUSE SCREEN: ADULT

## 2025-07-31 SDOH — SOCIAL STABILITY: SOCIAL INSECURITY: ARE THERE ANY APPARENT SIGNS OF INJURIES/BEHAVIORS THAT COULD BE RELATED TO ABUSE/NEGLECT?: NO

## 2025-07-31 SDOH — HEALTH STABILITY: MENTAL HEALTH: WERE YOU ABLE TO COMPLETE ALL THE BEHAVIORAL HEALTH SCREENINGS?: YES

## 2025-07-31 SDOH — SOCIAL STABILITY: SOCIAL INSECURITY: DOES ANYONE TRY TO KEEP YOU FROM HAVING/CONTACTING OTHER FRIENDS OR DOING THINGS OUTSIDE YOUR HOME?: NO

## 2025-07-31 SDOH — SOCIAL STABILITY: SOCIAL INSECURITY: ARE YOU OR HAVE YOU BEEN THREATENED OR ABUSED PHYSICALLY, EMOTIONALLY, OR SEXUALLY BY ANYONE?: NO

## 2025-07-31 SDOH — SOCIAL STABILITY: SOCIAL INSECURITY: HAS ANYONE EVER THREATENED TO HURT YOUR FAMILY OR YOUR PETS?: NO

## 2025-07-31 ASSESSMENT — PAIN SCALES - GENERAL: PAINLEVEL_OUTOF10: 10 - WORST POSSIBLE PAIN

## 2025-07-31 ASSESSMENT — LIFESTYLE VARIABLES
HOW OFTEN DO YOU HAVE A DRINK CONTAINING ALCOHOL: NEVER
AUDIT-C TOTAL SCORE: 0
SKIP TO QUESTIONS 9-10: 1
HOW MANY STANDARD DRINKS CONTAINING ALCOHOL DO YOU HAVE ON A TYPICAL DAY: PATIENT DOES NOT DRINK
HOW OFTEN DO YOU HAVE 6 OR MORE DRINKS ON ONE OCCASION: NEVER
AUDIT-C TOTAL SCORE: 0
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
## 2025-08-05 ENCOUNTER — ROUTINE PRENATAL (OUTPATIENT)
Dept: OBSTETRICS AND GYNECOLOGY | Facility: HOSPITAL | Age: 25
End: 2025-08-05
Payer: COMMERCIAL

## 2025-08-05 ENCOUNTER — HOSPITAL ENCOUNTER (INPATIENT)
Facility: HOSPITAL | Age: 25
LOS: 4 days | Discharge: HOME | End: 2025-08-09
Attending: OBSTETRICS & GYNECOLOGY | Admitting: OBSTETRICS & GYNECOLOGY
Payer: COMMERCIAL

## 2025-08-05 ENCOUNTER — ANESTHESIA (OUTPATIENT)
Dept: OBSTETRICS AND GYNECOLOGY | Facility: HOSPITAL | Age: 25
End: 2025-08-05
Payer: COMMERCIAL

## 2025-08-05 VITALS — BODY MASS INDEX: 31.24 KG/M2 | SYSTOLIC BLOOD PRESSURE: 114 MMHG | WEIGHT: 182 LBS | DIASTOLIC BLOOD PRESSURE: 71 MMHG

## 2025-08-05 DIAGNOSIS — Z3A.36 36 WEEKS GESTATION OF PREGNANCY (HHS-HCC): Primary | ICD-10-CM

## 2025-08-05 LAB
ABO GROUP (TYPE) IN BLOOD: NORMAL
ANTIBODY SCREEN: NORMAL
ERYTHROCYTE [DISTWIDTH] IN BLOOD BY AUTOMATED COUNT: 14.4 % (ref 11.5–14.5)
HCT VFR BLD AUTO: 31.9 % (ref 36–46)
HGB BLD-MCNC: 10.7 G/DL (ref 12–16)
MCH RBC QN AUTO: 28.8 PG (ref 26–34)
MCHC RBC AUTO-ENTMCNC: 33.5 G/DL (ref 32–36)
MCV RBC AUTO: 86 FL (ref 80–100)
NRBC BLD-RTO: 0 /100 WBCS (ref 0–0)
PLATELET # BLD AUTO: 232 X10*3/UL (ref 150–450)
POC APPEARANCE, URINE: CLEAR
POC BILIRUBIN, URINE: NEGATIVE
POC BLOOD, URINE: NEGATIVE
POC COLOR, URINE: YELLOW
POC GLUCOSE, URINE: NEGATIVE MG/DL
POC KETONES, URINE: NEGATIVE MG/DL
POC LEUKOCYTES, URINE: NEGATIVE
POC NITRITE,URINE: NEGATIVE
POC PH, URINE: 7 PH
POC PROTEIN, URINE: NEGATIVE MG/DL
POC SPECIFIC GRAVITY, URINE: 1.02
POC UROBILINOGEN, URINE: 1 EU/DL
RBC # BLD AUTO: 3.71 X10*6/UL (ref 4–5.2)
RH FACTOR (ANTIGEN D): NORMAL
WBC # BLD AUTO: 9.3 X10*3/UL (ref 4.4–11.3)

## 2025-08-05 PROCEDURE — 99212 OFFICE O/P EST SF 10 MIN: CPT

## 2025-08-05 PROCEDURE — 99214 OFFICE O/P EST MOD 30 MIN: CPT

## 2025-08-05 PROCEDURE — 59050 FETAL MONITOR W/REPORT: CPT

## 2025-08-05 PROCEDURE — 85027 COMPLETE CBC AUTOMATED: CPT

## 2025-08-05 PROCEDURE — 86780 TREPONEMA PALLIDUM: CPT

## 2025-08-05 PROCEDURE — 36415 COLL VENOUS BLD VENIPUNCTURE: CPT

## 2025-08-05 PROCEDURE — 1120000001 HC OB PRIVATE ROOM DAILY

## 2025-08-05 PROCEDURE — 81002 URINALYSIS NONAUTO W/O SCOPE: CPT

## 2025-08-05 PROCEDURE — 2500000004 HC RX 250 GENERAL PHARMACY W/ HCPCS (ALT 636 FOR OP/ED): Mod: SE

## 2025-08-05 PROCEDURE — 86923 COMPATIBILITY TEST ELECTRIC: CPT

## 2025-08-05 PROCEDURE — 86901 BLOOD TYPING SEROLOGIC RH(D): CPT

## 2025-08-05 RX ORDER — OXYTOCIN 10 [USP'U]/ML
10 INJECTION, SOLUTION INTRAMUSCULAR; INTRAVENOUS ONCE AS NEEDED
Status: DISCONTINUED | OUTPATIENT
Start: 2025-08-05 | End: 2025-08-06 | Stop reason: HOSPADM

## 2025-08-05 RX ORDER — HYDRALAZINE HYDROCHLORIDE 20 MG/ML
5 INJECTION INTRAMUSCULAR; INTRAVENOUS ONCE AS NEEDED
Status: DISCONTINUED | OUTPATIENT
Start: 2025-08-05 | End: 2025-08-05

## 2025-08-05 RX ORDER — ONDANSETRON HYDROCHLORIDE 2 MG/ML
4 INJECTION, SOLUTION INTRAVENOUS EVERY 6 HOURS PRN
Status: DISCONTINUED | OUTPATIENT
Start: 2025-08-05 | End: 2025-08-05

## 2025-08-05 RX ORDER — METHYLERGONOVINE MALEATE 0.2 MG/ML
0.2 INJECTION INTRAVENOUS ONCE AS NEEDED
Status: DISCONTINUED | OUTPATIENT
Start: 2025-08-05 | End: 2025-08-06 | Stop reason: HOSPADM

## 2025-08-05 RX ORDER — MISOPROSTOL 200 UG/1
800 TABLET ORAL ONCE AS NEEDED
Status: DISCONTINUED | OUTPATIENT
Start: 2025-08-05 | End: 2025-08-06 | Stop reason: HOSPADM

## 2025-08-05 RX ORDER — HYDRALAZINE HYDROCHLORIDE 20 MG/ML
5 INJECTION INTRAMUSCULAR; INTRAVENOUS ONCE AS NEEDED
Status: DISCONTINUED | OUTPATIENT
Start: 2025-08-05 | End: 2025-08-06 | Stop reason: HOSPADM

## 2025-08-05 RX ORDER — TRANEXAMIC ACID 1 G/10ML
1000 INJECTION, SOLUTION INTRAVENOUS ONCE AS NEEDED
Status: DISCONTINUED | OUTPATIENT
Start: 2025-08-05 | End: 2025-08-06 | Stop reason: HOSPADM

## 2025-08-05 RX ORDER — LOPERAMIDE HYDROCHLORIDE 2 MG/1
4 CAPSULE ORAL EVERY 2 HOUR PRN
Status: DISCONTINUED | OUTPATIENT
Start: 2025-08-05 | End: 2025-08-06 | Stop reason: HOSPADM

## 2025-08-05 RX ORDER — SODIUM CHLORIDE, SODIUM LACTATE, POTASSIUM CHLORIDE, CALCIUM CHLORIDE 600; 310; 30; 20 MG/100ML; MG/100ML; MG/100ML; MG/100ML
75 INJECTION, SOLUTION INTRAVENOUS CONTINUOUS
Status: DISCONTINUED | OUTPATIENT
Start: 2025-08-05 | End: 2025-08-06

## 2025-08-05 RX ORDER — ONDANSETRON 4 MG/1
4 TABLET, FILM COATED ORAL EVERY 6 HOURS PRN
Status: DISCONTINUED | OUTPATIENT
Start: 2025-08-05 | End: 2025-08-06

## 2025-08-05 RX ORDER — LABETALOL HYDROCHLORIDE 5 MG/ML
20 INJECTION, SOLUTION INTRAVENOUS ONCE AS NEEDED
Status: DISCONTINUED | OUTPATIENT
Start: 2025-08-05 | End: 2025-08-06 | Stop reason: HOSPADM

## 2025-08-05 RX ORDER — ONDANSETRON 4 MG/1
4 TABLET, FILM COATED ORAL EVERY 6 HOURS PRN
Status: DISCONTINUED | OUTPATIENT
Start: 2025-08-05 | End: 2025-08-05

## 2025-08-05 RX ORDER — ONDANSETRON HYDROCHLORIDE 2 MG/ML
4 INJECTION, SOLUTION INTRAVENOUS EVERY 6 HOURS PRN
Status: DISCONTINUED | OUTPATIENT
Start: 2025-08-05 | End: 2025-08-06

## 2025-08-05 RX ORDER — OXYTOCIN/0.9 % SODIUM CHLORIDE 30/500 ML
60 PLASTIC BAG, INJECTION (ML) INTRAVENOUS ONCE AS NEEDED
Status: DISCONTINUED | OUTPATIENT
Start: 2025-08-05 | End: 2025-08-06 | Stop reason: HOSPADM

## 2025-08-05 RX ORDER — CARBOPROST TROMETHAMINE 250 UG/ML
250 INJECTION, SOLUTION INTRAMUSCULAR ONCE AS NEEDED
Status: DISCONTINUED | OUTPATIENT
Start: 2025-08-05 | End: 2025-08-06 | Stop reason: HOSPADM

## 2025-08-05 RX ORDER — TERBUTALINE SULFATE 1 MG/ML
0.25 INJECTION SUBCUTANEOUS ONCE AS NEEDED
Status: DISCONTINUED | OUTPATIENT
Start: 2025-08-05 | End: 2025-08-06 | Stop reason: HOSPADM

## 2025-08-05 RX ORDER — LABETALOL HYDROCHLORIDE 5 MG/ML
20 INJECTION, SOLUTION INTRAVENOUS ONCE AS NEEDED
Status: DISCONTINUED | OUTPATIENT
Start: 2025-08-05 | End: 2025-08-05

## 2025-08-05 RX ORDER — LIDOCAINE HYDROCHLORIDE 10 MG/ML
0.5 INJECTION, SOLUTION INFILTRATION; PERINEURAL ONCE AS NEEDED
Status: DISCONTINUED | OUTPATIENT
Start: 2025-08-05 | End: 2025-08-05

## 2025-08-05 RX ORDER — LIDOCAINE HYDROCHLORIDE 10 MG/ML
20 INJECTION, SOLUTION INFILTRATION; PERINEURAL ONCE AS NEEDED
Status: DISCONTINUED | OUTPATIENT
Start: 2025-08-05 | End: 2025-08-06 | Stop reason: HOSPADM

## 2025-08-05 RX ORDER — CALCIUM CARBONATE 200(500)MG
1 TABLET,CHEWABLE ORAL EVERY 6 HOURS PRN
Status: DISCONTINUED | OUTPATIENT
Start: 2025-08-05 | End: 2025-08-09 | Stop reason: HOSPADM

## 2025-08-05 RX ADMIN — SODIUM CHLORIDE, SODIUM LACTATE, POTASSIUM CHLORIDE, AND CALCIUM CHLORIDE 75 ML/HR: .6; .31; .03; .02 INJECTION, SOLUTION INTRAVENOUS at 21:30

## 2025-08-05 RX ADMIN — SODIUM CHLORIDE, SODIUM LACTATE, POTASSIUM CHLORIDE, AND CALCIUM CHLORIDE 500 ML: .6; .31; .03; .02 INJECTION, SOLUTION INTRAVENOUS at 22:54

## 2025-08-05 SDOH — HEALTH STABILITY: MENTAL HEALTH: NON-SPECIFIC ACTIVE SUICIDAL THOUGHTS (PAST 1 MONTH): NO

## 2025-08-05 SDOH — HEALTH STABILITY: PHYSICAL HEALTH
HOW OFTEN DO YOU NEED TO HAVE SOMEONE HELP YOU WHEN YOU READ INSTRUCTIONS, PAMPHLETS, OR OTHER WRITTEN MATERIAL FROM YOUR DOCTOR OR PHARMACY?: NEVER

## 2025-08-05 SDOH — HEALTH STABILITY: MENTAL HEALTH
DO YOU FEEL STRESS - TENSE, RESTLESS, NERVOUS, OR ANXIOUS, OR UNABLE TO SLEEP AT NIGHT BECAUSE YOUR MIND IS TROUBLED ALL THE TIME - THESE DAYS?: ONLY A LITTLE

## 2025-08-05 SDOH — HEALTH STABILITY: MENTAL HEALTH: HOW MANY DRINKS CONTAINING ALCOHOL DO YOU HAVE ON A TYPICAL DAY WHEN YOU ARE DRINKING?: PATIENT DOES NOT DRINK

## 2025-08-05 SDOH — HEALTH STABILITY: MENTAL HEALTH: SUICIDAL BEHAVIOR (LIFETIME): NO

## 2025-08-05 SDOH — HEALTH STABILITY: MENTAL HEALTH: HOW OFTEN DO YOU HAVE SIX OR MORE DRINKS ON ONE OCCASION?: NEVER

## 2025-08-05 SDOH — HEALTH STABILITY: MENTAL HEALTH: WERE YOU ABLE TO COMPLETE ALL THE BEHAVIORAL HEALTH SCREENINGS?: YES

## 2025-08-05 SDOH — HEALTH STABILITY: MENTAL HEALTH: WISH TO BE DEAD (PAST 1 MONTH): NO

## 2025-08-05 SDOH — SOCIAL STABILITY: SOCIAL INSECURITY: HAVE YOU HAD ANY THOUGHTS OF HARMING ANYONE ELSE?: NO

## 2025-08-05 SDOH — HEALTH STABILITY: MENTAL HEALTH: HOW OFTEN DO YOU HAVE A DRINK CONTAINING ALCOHOL?: NEVER

## 2025-08-05 SDOH — SOCIAL STABILITY: SOCIAL INSECURITY: PHYSICAL ABUSE: DENIES

## 2025-08-05 SDOH — SOCIAL STABILITY: SOCIAL INSECURITY: WITHIN THE LAST YEAR, HAVE YOU BEEN AFRAID OF YOUR PARTNER OR EX-PARTNER?: NO

## 2025-08-05 SDOH — SOCIAL STABILITY: SOCIAL INSECURITY: DOES ANYONE TRY TO KEEP YOU FROM HAVING/CONTACTING OTHER FRIENDS OR DOING THINGS OUTSIDE YOUR HOME?: NO

## 2025-08-05 SDOH — SOCIAL STABILITY: SOCIAL INSECURITY: WITHIN THE LAST YEAR, HAVE YOU BEEN HUMILIATED OR EMOTIONALLY ABUSED IN OTHER WAYS BY YOUR PARTNER OR EX-PARTNER?: NO

## 2025-08-05 SDOH — SOCIAL STABILITY: SOCIAL INSECURITY: ABUSE SCREEN: ADULT

## 2025-08-05 SDOH — HEALTH STABILITY: MENTAL HEALTH: HAVE YOU USED ANY SUBSTANCES (CANABIS, COCAINE, HEROIN, HALLUCINOGENS, INHALANTS, ETC.) IN THE PAST 12 MONTHS?: NO

## 2025-08-05 SDOH — SOCIAL STABILITY: SOCIAL INSECURITY: VERBAL ABUSE: DENIES

## 2025-08-05 SDOH — SOCIAL STABILITY: SOCIAL INSECURITY: ARE YOU OR HAVE YOU BEEN THREATENED OR ABUSED PHYSICALLY, EMOTIONALLY, OR SEXUALLY BY ANYONE?: NO

## 2025-08-05 SDOH — ECONOMIC STABILITY: HOUSING INSECURITY: DO YOU FEEL UNSAFE GOING BACK TO THE PLACE WHERE YOU ARE LIVING?: NO

## 2025-08-05 SDOH — SOCIAL STABILITY: SOCIAL INSECURITY: HAS ANYONE EVER THREATENED TO HURT YOUR FAMILY OR YOUR PETS?: NO

## 2025-08-05 SDOH — SOCIAL STABILITY: SOCIAL INSECURITY: HAVE YOU HAD THOUGHTS OF HARMING ANYONE ELSE?: NO

## 2025-08-05 SDOH — SOCIAL STABILITY: SOCIAL INSECURITY: ARE THERE ANY APPARENT SIGNS OF INJURIES/BEHAVIORS THAT COULD BE RELATED TO ABUSE/NEGLECT?: NO

## 2025-08-05 SDOH — SOCIAL STABILITY: SOCIAL INSECURITY: DO YOU FEEL ANYONE HAS EXPLOITED OR TAKEN ADVANTAGE OF YOU FINANCIALLY OR OF YOUR PERSONAL PROPERTY?: NO

## 2025-08-05 SDOH — HEALTH STABILITY: MENTAL HEALTH: HAVE YOU USED ANY PRESCRIPTION DRUGS OTHER THAN PRESCRIBED IN THE PAST 12 MONTHS?: NO

## 2025-08-05 ASSESSMENT — PAIN SCALES - GENERAL
PAINLEVEL_OUTOF10: 3
PAINLEVEL_OUTOF10: 2
PAINLEVEL_OUTOF10: 2
PAINLEVEL_OUTOF10: 1
PAINLEVEL_OUTOF10: 2
PAINLEVEL_OUTOF10: 0 - NO PAIN

## 2025-08-05 ASSESSMENT — PATIENT HEALTH QUESTIONNAIRE - PHQ9
2. FEELING DOWN, DEPRESSED OR HOPELESS: NOT AT ALL
1. LITTLE INTEREST OR PLEASURE IN DOING THINGS: NOT AT ALL
2. FEELING DOWN, DEPRESSED OR HOPELESS: NOT AT ALL
SUM OF ALL RESPONSES TO PHQ9 QUESTIONS 1 & 2: 0
1. LITTLE INTEREST OR PLEASURE IN DOING THINGS: NOT AT ALL
SUM OF ALL RESPONSES TO PHQ9 QUESTIONS 1 & 2: 0

## 2025-08-05 ASSESSMENT — LIFESTYLE VARIABLES
AUDIT-C TOTAL SCORE: 0
AUDIT-C TOTAL SCORE: 0
SKIP TO QUESTIONS 9-10: 1
HOW OFTEN DO YOU HAVE A DRINK CONTAINING ALCOHOL: NEVER
SKIP TO QUESTIONS 9-10: 1
HOW OFTEN DO YOU HAVE 6 OR MORE DRINKS ON ONE OCCASION: NEVER
HOW OFTEN DO YOU HAVE SIX OR MORE DRINKS ON ONE OCCASION: NEVER
HOW OFTEN DO YOU HAVE A DRINK CONTAINING ALCOHOL: NEVER
HOW MANY STANDARD DRINKS CONTAINING ALCOHOL DO YOU HAVE ON A TYPICAL DAY: PATIENT DOES NOT DRINK
SKIP TO QUESTIONS 9-10: 1
AUDIT-C TOTAL SCORE: 0
AUDIT-C TOTAL SCORE: 0
HOW MANY STANDARD DRINKS CONTAINING ALCOHOL DO YOU HAVE ON A TYPICAL DAY: PATIENT DOES NOT DRINK

## 2025-08-05 ASSESSMENT — ACTIVITIES OF DAILY LIVING (ADL): LACK_OF_TRANSPORTATION: NO

## 2025-08-05 NOTE — PROGRESS NOTES
Ob Visit  25     SUBJECTIVE      HPI: Fatou Zambrano is a 25 y.o.  at 36w5d here for RPNV.  She has irregular contractions, no bleeding, or no LOF. Reports normal fetal movement. Patient reports no concerns.       OBJECTIVE  Visit Vitals  /71   Wt 82.6 kg (182 lb)   LMP 2024   BMI 31.24 kg/m²   OB Status Pregnant   Smoking Status Never   BSA 1.93 m²            ASSESSMENT & PLAN    Fatou Zambrano is a 25 y.o.  at 36w5d here for the following concerns we addressed today:    Problem List Items Addressed This Visit       36 weeks gestation of pregnancy ([facility]) - Primary    Overview   Desired provider in labor: [] CNM  [] Physician  [x] Blood Products: [x] Yes, accepts [] No, needs counseling  [x] Initial BMI: 26.20  [x] Prenatal Labs:   [x] Cervical Cancer Screening up to date due PP  [x] Rh status: Rh+  [x] Genetic Screening:   RR cf DNA male  [x] NT US: (11-13 wks)  [x] Baby ASA (if indicated):  [x] Pregnancy dated by: LMP    [x] Anatomy US: (19-20 wks) WNL  [x] Federal Sterilization consent signed (if indicated): 2025  [x] 1hr GCT at 24-28wks: 72  [] Rhogam (if indicated): Not applicable  [] Fetal Surveillance (if indicated):  [x] Tdap (27-32 wks, may be given up to 36 wks if initial window missed): declined 2025    [] Breastfeeding:  [x] Postpartum Birth control method: PPTL  [x] GBS at 36 - 37 wks: collected 2025  [] 39 weeks discussion of IOL vs. Expectant management:  [x] Mode of delivery ( anticipated ): rLTCS           Relevant Orders    QUEST CULTURE, GROUP B STREP WITH SUSCEPTIBILITY         RTC in 1 week      YAW Hills-ALLY

## 2025-08-06 ENCOUNTER — ANESTHESIA EVENT (OUTPATIENT)
Dept: OBSTETRICS AND GYNECOLOGY | Facility: HOSPITAL | Age: 25
End: 2025-08-06
Payer: COMMERCIAL

## 2025-08-06 LAB
BASE EXCESS BLDCOA CALC-SCNC: -1.4 MMOL/L (ref -10.8–-0.5)
BASE EXCESS BLDCOV CALC-SCNC: -0.2 MMOL/L (ref -8.1–-0.5)
BODY TEMPERATURE: 37 DEGREES CELSIUS
BODY TEMPERATURE: 37 DEGREES CELSIUS
HCO3 BLDCOA-SCNC: 26.6 MMOL/L (ref 15–29)
HCO3 BLDCOV-SCNC: 25.4 MMOL/L (ref 16–26)
INHALED O2 CONCENTRATION: 21 %
INHALED O2 CONCENTRATION: 21 %
OXYHGB MFR BLDCOA: 18.4 % (ref 94–98)
OXYHGB MFR BLDCOV: 66 % (ref 94–98)
PCO2 BLDCOA: 58 MM HG (ref 31–75)
PCO2 BLDCOV: 44 MM HG (ref 22–53)
PH BLDCOA: 7.27 PH (ref 7.08–7.39)
PH BLDCOV: 7.37 PH (ref 7.19–7.47)
PO2 BLDCOA: 17 MM HG (ref 5–31)
PO2 BLDCOV: 32 MM HG (ref 13–37)
SAO2 % BLDCOA: 19 % (ref 3–69)
SAO2 % BLDCOV: 68 % (ref 16–84)
TREPONEMA PALLIDUM IGG+IGM AB [PRESENCE] IN SERUM OR PLASMA BY IMMUNOASSAY: NONREACTIVE

## 2025-08-06 PROCEDURE — 2500000004 HC RX 250 GENERAL PHARMACY W/ HCPCS (ALT 636 FOR OP/ED): Mod: SE

## 2025-08-06 PROCEDURE — 2720000007 HC OR 272 NO HCPCS

## 2025-08-06 PROCEDURE — 58611 LIGATE OVIDUCT(S) ADD-ON: CPT | Performed by: OBSTETRICS & GYNECOLOGY

## 2025-08-06 PROCEDURE — 01961 ANES CESAREAN DELIVERY ONLY: CPT | Performed by: ANESTHESIOLOGY

## 2025-08-06 PROCEDURE — 7100000016 HC LABOR RECOVERY PER HOUR: Performed by: OBSTETRICS & GYNECOLOGY

## 2025-08-06 PROCEDURE — 3700000014 HC AN EPIDURAL BLOCK CHARGE: Performed by: OBSTETRICS & GYNECOLOGY

## 2025-08-06 PROCEDURE — 1210000001 HC SEMI-PRIVATE ROOM DAILY

## 2025-08-06 PROCEDURE — 82805 BLOOD GASES W/O2 SATURATION: CPT

## 2025-08-06 PROCEDURE — 2720000007 HC OR 272 NO HCPCS: Performed by: OBSTETRICS & GYNECOLOGY

## 2025-08-06 PROCEDURE — 51701 INSERT BLADDER CATHETER: CPT

## 2025-08-06 PROCEDURE — 59514 CESAREAN DELIVERY ONLY: CPT | Performed by: OBSTETRICS & GYNECOLOGY

## 2025-08-06 PROCEDURE — 7100000016 HC LABOR RECOVERY PER HOUR

## 2025-08-06 PROCEDURE — 2500000001 HC RX 250 WO HCPCS SELF ADMINISTERED DRUGS (ALT 637 FOR MEDICARE OP): Mod: SE

## 2025-08-06 PROCEDURE — 0UB70ZZ EXCISION OF BILATERAL FALLOPIAN TUBES, OPEN APPROACH: ICD-10-PCS | Performed by: OBSTETRICS & GYNECOLOGY

## 2025-08-06 RX ORDER — HYDRALAZINE HYDROCHLORIDE 20 MG/ML
5 INJECTION INTRAMUSCULAR; INTRAVENOUS ONCE AS NEEDED
Status: DISCONTINUED | OUTPATIENT
Start: 2025-08-06 | End: 2025-08-09 | Stop reason: HOSPADM

## 2025-08-06 RX ORDER — METOCLOPRAMIDE HYDROCHLORIDE 5 MG/ML
INJECTION INTRAMUSCULAR; INTRAVENOUS AS NEEDED
Status: DISCONTINUED | OUTPATIENT
Start: 2025-08-06 | End: 2025-08-06

## 2025-08-06 RX ORDER — SIMETHICONE 80 MG
80 TABLET,CHEWABLE ORAL 4 TIMES DAILY PRN
Status: DISCONTINUED | OUTPATIENT
Start: 2025-08-06 | End: 2025-08-09 | Stop reason: HOSPADM

## 2025-08-06 RX ORDER — ADHESIVE BANDAGE
10 BANDAGE TOPICAL
Status: DISCONTINUED | OUTPATIENT
Start: 2025-08-06 | End: 2025-08-09 | Stop reason: HOSPADM

## 2025-08-06 RX ORDER — OXYTOCIN 10 [USP'U]/ML
10 INJECTION, SOLUTION INTRAMUSCULAR; INTRAVENOUS ONCE AS NEEDED
Status: DISCONTINUED | OUTPATIENT
Start: 2025-08-06 | End: 2025-08-09 | Stop reason: HOSPADM

## 2025-08-06 RX ORDER — OXYCODONE HYDROCHLORIDE 5 MG/1
5 TABLET ORAL EVERY 4 HOURS PRN
Status: DISCONTINUED | OUTPATIENT
Start: 2025-08-07 | End: 2025-08-09 | Stop reason: HOSPADM

## 2025-08-06 RX ORDER — LABETALOL HYDROCHLORIDE 5 MG/ML
20 INJECTION, SOLUTION INTRAVENOUS ONCE AS NEEDED
Status: DISCONTINUED | OUTPATIENT
Start: 2025-08-06 | End: 2025-08-09 | Stop reason: HOSPADM

## 2025-08-06 RX ORDER — DIPHENHYDRAMINE HCL 25 MG
50 CAPSULE ORAL ONCE
Status: COMPLETED | OUTPATIENT
Start: 2025-08-06 | End: 2025-08-06

## 2025-08-06 RX ORDER — FENTANYL CITRATE 50 UG/ML
INJECTION, SOLUTION INTRAMUSCULAR; INTRAVENOUS AS NEEDED
Status: DISCONTINUED | OUTPATIENT
Start: 2025-08-06 | End: 2025-08-06

## 2025-08-06 RX ORDER — OXYTOCIN/0.9 % SODIUM CHLORIDE 30/500 ML
60 PLASTIC BAG, INJECTION (ML) INTRAVENOUS ONCE AS NEEDED
Status: DISCONTINUED | OUTPATIENT
Start: 2025-08-06 | End: 2025-08-09 | Stop reason: HOSPADM

## 2025-08-06 RX ORDER — MISOPROSTOL 200 UG/1
800 TABLET ORAL ONCE AS NEEDED
Status: DISCONTINUED | OUTPATIENT
Start: 2025-08-06 | End: 2025-08-09 | Stop reason: HOSPADM

## 2025-08-06 RX ORDER — PHENYLEPHRINE 10 MG/250 ML(40 MCG/ML)IN 0.9 % SOD.CHLORIDE INTRAVENOUS
CONTINUOUS PRN
Status: DISCONTINUED | OUTPATIENT
Start: 2025-08-06 | End: 2025-08-06

## 2025-08-06 RX ORDER — IBUPROFEN 600 MG/1
600 TABLET, FILM COATED ORAL EVERY 6 HOURS
Status: DISCONTINUED | OUTPATIENT
Start: 2025-08-07 | End: 2025-08-09 | Stop reason: HOSPADM

## 2025-08-06 RX ORDER — KETOROLAC TROMETHAMINE 30 MG/ML
INJECTION, SOLUTION INTRAMUSCULAR; INTRAVENOUS AS NEEDED
Status: DISCONTINUED | OUTPATIENT
Start: 2025-08-06 | End: 2025-08-06

## 2025-08-06 RX ORDER — LIDOCAINE 560 MG/1
1 PATCH PERCUTANEOUS; TOPICAL; TRANSDERMAL
Status: DISCONTINUED | OUTPATIENT
Start: 2025-08-06 | End: 2025-08-09 | Stop reason: HOSPADM

## 2025-08-06 RX ORDER — LOPERAMIDE HYDROCHLORIDE 2 MG/1
4 CAPSULE ORAL EVERY 2 HOUR PRN
Status: DISCONTINUED | OUTPATIENT
Start: 2025-08-06 | End: 2025-08-09 | Stop reason: HOSPADM

## 2025-08-06 RX ORDER — METHYLERGONOVINE MALEATE 0.2 MG/ML
0.2 INJECTION INTRAVENOUS ONCE AS NEEDED
Status: DISCONTINUED | OUTPATIENT
Start: 2025-08-06 | End: 2025-08-09 | Stop reason: HOSPADM

## 2025-08-06 RX ORDER — ACETAMINOPHEN 120 MG/1
SUPPOSITORY RECTAL AS NEEDED
Status: DISCONTINUED | OUTPATIENT
Start: 2025-08-06 | End: 2025-08-06

## 2025-08-06 RX ORDER — HYDROMORPHONE HYDROCHLORIDE 0.2 MG/ML
0.2 INJECTION INTRAMUSCULAR; INTRAVENOUS; SUBCUTANEOUS EVERY 5 MIN PRN
Status: DISCONTINUED | OUTPATIENT
Start: 2025-08-06 | End: 2025-08-09 | Stop reason: HOSPADM

## 2025-08-06 RX ORDER — POLYETHYLENE GLYCOL 3350 17 G/17G
17 POWDER, FOR SOLUTION ORAL 2 TIMES DAILY PRN
Status: DISCONTINUED | OUTPATIENT
Start: 2025-08-06 | End: 2025-08-09 | Stop reason: HOSPADM

## 2025-08-06 RX ORDER — CEFAZOLIN 1 G/1
INJECTION, POWDER, FOR SOLUTION INTRAVENOUS AS NEEDED
Status: DISCONTINUED | OUTPATIENT
Start: 2025-08-06 | End: 2025-08-06

## 2025-08-06 RX ORDER — NALOXONE HYDROCHLORIDE 0.4 MG/ML
0.1 INJECTION, SOLUTION INTRAMUSCULAR; INTRAVENOUS; SUBCUTANEOUS EVERY 5 MIN PRN
Status: DISCONTINUED | OUTPATIENT
Start: 2025-08-06 | End: 2025-08-09 | Stop reason: HOSPADM

## 2025-08-06 RX ORDER — TRANEXAMIC ACID 1 G/10ML
1000 INJECTION, SOLUTION INTRAVENOUS ONCE AS NEEDED
Status: ACTIVE | OUTPATIENT
Start: 2025-08-06 | End: 2025-08-09

## 2025-08-06 RX ORDER — OXYCODONE HYDROCHLORIDE 10 MG/1
10 TABLET ORAL EVERY 4 HOURS PRN
Status: DISCONTINUED | OUTPATIENT
Start: 2025-08-07 | End: 2025-08-09 | Stop reason: HOSPADM

## 2025-08-06 RX ORDER — ONDANSETRON 4 MG/1
4 TABLET, FILM COATED ORAL EVERY 6 HOURS PRN
Status: DISCONTINUED | OUTPATIENT
Start: 2025-08-06 | End: 2025-08-09 | Stop reason: HOSPADM

## 2025-08-06 RX ORDER — ONDANSETRON HYDROCHLORIDE 2 MG/ML
4 INJECTION, SOLUTION INTRAVENOUS EVERY 6 HOURS PRN
Status: DISCONTINUED | OUTPATIENT
Start: 2025-08-06 | End: 2025-08-09 | Stop reason: HOSPADM

## 2025-08-06 RX ORDER — CARBOPROST TROMETHAMINE 250 UG/ML
250 INJECTION, SOLUTION INTRAMUSCULAR ONCE AS NEEDED
Status: DISCONTINUED | OUTPATIENT
Start: 2025-08-06 | End: 2025-08-09 | Stop reason: HOSPADM

## 2025-08-06 RX ORDER — DIPHENHYDRAMINE HYDROCHLORIDE 50 MG/ML
25 INJECTION, SOLUTION INTRAMUSCULAR; INTRAVENOUS EVERY 4 HOURS PRN
Status: DISCONTINUED | OUTPATIENT
Start: 2025-08-06 | End: 2025-08-09 | Stop reason: HOSPADM

## 2025-08-06 RX ORDER — MORPHINE SULFATE 0.5 MG/ML
INJECTION, SOLUTION EPIDURAL; INTRATHECAL; INTRAVENOUS AS NEEDED
Status: DISCONTINUED | OUTPATIENT
Start: 2025-08-06 | End: 2025-08-06

## 2025-08-06 RX ORDER — DIPHENHYDRAMINE HCL 25 MG
25 CAPSULE ORAL EVERY 4 HOURS PRN
Status: DISCONTINUED | OUTPATIENT
Start: 2025-08-06 | End: 2025-08-09 | Stop reason: HOSPADM

## 2025-08-06 RX ORDER — KETOROLAC TROMETHAMINE 30 MG/ML
30 INJECTION, SOLUTION INTRAMUSCULAR; INTRAVENOUS EVERY 6 HOURS
Status: COMPLETED | OUTPATIENT
Start: 2025-08-06 | End: 2025-08-06

## 2025-08-06 RX ORDER — FAMOTIDINE 10 MG/ML
INJECTION INTRAVENOUS AS NEEDED
Status: DISCONTINUED | OUTPATIENT
Start: 2025-08-06 | End: 2025-08-06

## 2025-08-06 RX ORDER — ENOXAPARIN SODIUM 100 MG/ML
40 INJECTION SUBCUTANEOUS EVERY 24 HOURS
Status: DISCONTINUED | OUTPATIENT
Start: 2025-08-06 | End: 2025-08-09 | Stop reason: HOSPADM

## 2025-08-06 RX ORDER — ACETAMINOPHEN 325 MG/1
975 TABLET ORAL EVERY 6 HOURS
Status: DISCONTINUED | OUTPATIENT
Start: 2025-08-06 | End: 2025-08-09 | Stop reason: HOSPADM

## 2025-08-06 RX ADMIN — DIPHENHYDRAMINE HYDROCHLORIDE 50 MG: 25 CAPSULE ORAL at 05:04

## 2025-08-06 RX ADMIN — ACETAMINOPHEN 975 MG: 325 TABLET ORAL at 10:25

## 2025-08-06 RX ADMIN — FENTANYL CITRATE 10 MCG: 50 INJECTION, SOLUTION INTRAMUSCULAR; INTRAVENOUS at 01:39

## 2025-08-06 RX ADMIN — KETOROLAC TROMETHAMINE 30 MG: 30 INJECTION, SOLUTION INTRAMUSCULAR; INTRAVENOUS at 22:39

## 2025-08-06 RX ADMIN — PHENYLEPHRINE-NACL IV SOLUTION 10 MG/250ML-0.9% 0.6 MCG/KG/MIN: 10-0.9/25 SOLUTION at 01:40

## 2025-08-06 RX ADMIN — DIPHENHYDRAMINE HYDROCHLORIDE 25 MG: 50 INJECTION INTRAMUSCULAR; INTRAVENOUS at 12:37

## 2025-08-06 RX ADMIN — ENOXAPARIN SODIUM 40 MG: 100 INJECTION SUBCUTANEOUS at 16:38

## 2025-08-06 RX ADMIN — ACETAMINOPHEN 975 MG: 325 TABLET ORAL at 22:39

## 2025-08-06 RX ADMIN — KETOROLAC TROMETHAMINE 30 MG: 30 INJECTION, SOLUTION INTRAMUSCULAR; INTRAVENOUS at 02:36

## 2025-08-06 RX ADMIN — ONDANSETRON 4 MG: 2 INJECTION, SOLUTION INTRAMUSCULAR; INTRAVENOUS at 01:03

## 2025-08-06 RX ADMIN — METOCLOPRAMIDE 10 MG: 5 INJECTION, SOLUTION INTRAMUSCULAR; INTRAVENOUS at 02:10

## 2025-08-06 RX ADMIN — OXYTOCIN 600 MILLI-UNITS/MIN: 10 INJECTION, SOLUTION INTRAMUSCULAR; INTRAVENOUS at 02:06

## 2025-08-06 RX ADMIN — ACETAMINOPHEN 975 MG: 325 TABLET ORAL at 16:37

## 2025-08-06 RX ADMIN — SODIUM CHLORIDE, SODIUM LACTATE, POTASSIUM CHLORIDE, AND CALCIUM CHLORIDE: 600; 310; 30; 20 INJECTION, SOLUTION INTRAVENOUS at 01:06

## 2025-08-06 RX ADMIN — KETOROLAC TROMETHAMINE 30 MG: 30 INJECTION, SOLUTION INTRAMUSCULAR; INTRAVENOUS at 16:38

## 2025-08-06 RX ADMIN — FAMOTIDINE 20 MG: 10 INJECTION, SOLUTION INTRAVENOUS at 01:03

## 2025-08-06 RX ADMIN — CEFAZOLIN 2 G: 1 INJECTION, POWDER, FOR SOLUTION INTRAMUSCULAR; INTRAVENOUS at 01:50

## 2025-08-06 RX ADMIN — ACETAMINOPHEN 650 MG: 120 SUPPOSITORY RECTAL at 02:57

## 2025-08-06 RX ADMIN — MORPHINE SULFATE 0.15 MG: 0.5 INJECTION EPIDURAL; INTRATHECAL; INTRAVENOUS at 01:39

## 2025-08-06 RX ADMIN — KETOROLAC TROMETHAMINE 30 MG: 30 INJECTION, SOLUTION INTRAMUSCULAR; INTRAVENOUS at 10:25

## 2025-08-06 ASSESSMENT — PAIN SCALES - GENERAL
PAINLEVEL_OUTOF10: 0 - NO PAIN
PAINLEVEL_OUTOF10: 2
PAINLEVEL_OUTOF10: 4
PAINLEVEL_OUTOF10: 0 - NO PAIN

## 2025-08-06 ASSESSMENT — PAIN DESCRIPTION - DESCRIPTORS: DESCRIPTORS: ACHING;SORE

## 2025-08-06 NOTE — ANESTHESIA POSTPROCEDURE EVALUATION
Patient: Fatou Zambrano    Procedure Summary       Date: 25 Room / Location: [facility] OB    Anesthesia Start: 106 Anesthesia Stop: 318    Procedure:  DELIVERY Diagnosis:       Previous  section      (Previous  section [Z98.891])    Surgeons: Jolie Martin MD Responsible Provider: Eliazar Camarena DO    Anesthesia Type: CSE ASA Status: 3            Anesthesia Type: CSE    Vitals Value Taken Time   BP 98/53 25 03:18   Temp 36 25 03:18   Pulse 86 25 03:18   Resp 14 25 03:18   SpO2 99% 25 03:18       Anesthesia Post Evaluation    Patient location during evaluation: floor  Patient participation: complete - patient participated  Level of consciousness: awake and alert  Pain management: adequate  Airway patency: patent  Cardiovascular status: acceptable, hemodynamically stable and blood pressure returned to baseline  Respiratory status: acceptable, spontaneous ventilation and unassisted  Hydration status: acceptable  Postoperative Nausea and Vomiting: none        There were no known notable events for this encounter.

## 2025-08-06 NOTE — CARE PLAN
The patient's goals for the shift include  have a safe and healthy delivery.    The clinical goals for the shift include  maintain reassuring FHR on CFM, manage pain and anxiety through stay.

## 2025-08-06 NOTE — H&P
OB Admission H&P    Assessment/Plan    Fatou Zambrano is a 25 y.o.  at 36w5d, dated by LMP c/w 7wk US, admitted from triage d/t NRFHT and c/f PTL with hx of prior CS x 2.    NRFHT  C/f PTL  -pt presented to triage with consistent contractions ~q5mins and tracing notable for recurrent late and variable decels   -initial concern for need of urgent delivery however tracing improved after transport to labor room   -SVE on arrival while in triage /-3, unchanged currently on my exam.  -Discussed non-reassuring tracing in detail with the patient and need for continued monitoring and likely delivery if decels remain persistent.  -Pt with history of prior CS x 2. Last ate ~1700. NPO time will be 0100 . Discussed potential need for CS prior to this time depending on FHT.  -Consents signed.   -Will continue with CEFM at this time     Fetal Status   -IUP @ 36.5wga  -CEFM currently Cat I however has mostly been Cat II d/t recurrent late and variable decels. Will cont to monitor closely  -Scanned cephalic by BSUS  -GBS collected previously in office, pending     Postpartum  Contraception Plan: tubal ligation, consents signed       Subjective   Fatou Zambrano is a 24y/o  @ 36.5wga, dated by LMP c/w 7 wk US, admitted from triage for contraction and NRFHT. She reports contractions started last night and have continued to increase in intensity and frequency since that time. She denies vb, lof, or dfm.    Pregnancy Notables   - Hx of CS x2, pCS in s/o of NRFHT  - Hx of congenital heart defect in 2nd child  - Anxiety, no meds   - Sickle cell trait    Prenatal Provider:  ALANA- Stanton    OB History    Para Term  AB Living   4 2 2 0 1 2   SAB IAB Ectopic Multiple Live Births   1 0 0 0 2      # Outcome Date GA Lbr [place]/2nd Weight Sex Type Anes PTL Lv   4 Current            3 Term 23    M CS-LTranv EPI N AB   2 SAB 22           1 Term 21   2.863 kg F CS-LTranv EPI N  AB       Surgical History[1]    Social History     Tobacco Use    Smoking status: Never    Smokeless tobacco: Never   Substance Use Topics    Alcohol use: Never       Allergies[2]    Prescriptions Prior to Admission[3]  Objective     Last Vitals  Temp Pulse Resp BP MAP O2 Sat   36.9 °C (98.4 °F) 83 18 105/66 80 100 %     Physical Exam  General: NAD, mood appropriate  Cardiopulmonary: warm and well perfused, breathing comfortably on room air  Abdomen: Gravid, non-tender  Extremities: Symmetric  Cervix: 1 /50 /-3     Fetal Monitoring  Baseline: 140bpm, Variability: moderate,  Accelerations: present and Decelerations: none  Uterine Activity: Contractions present  Interpretation: Reactive    Prenatal labs reviewed.       [1]   Past Surgical History:  Procedure Laterality Date    OTHER SURGICAL HISTORY  2021     section   [2]   Allergies  Allergen Reactions    Amoxicillin Unknown    Other Unknown     Penicillins  Cross Reactors      Penicillin Unknown    Tomato Itching     The patient states her tongue and tonsils itch after eating Tomatoes.    Penicillins Rash   [3]   Medications Prior to Admission   Medication Sig Dispense Refill Last Dose/Taking    acetaminophen (Tylenol) 500 mg tablet Take 2 tablets (1,000 mg) by mouth every 6 hours if needed for mild pain (1 - 3). 90 tablet 0     cholecalciferol (Vitamin D-3) 25 mcg (1,000 units) tablet Take 1 tablet (25 mcg) by mouth once daily. 30 tablet 2     cyclobenzaprine (Flexeril) 5 mg tablet Take 1 tablet (5 mg) by mouth 3 times a day as needed for muscle spasms. 30 tablet 0     diphenhydrAMINE (BENADryl) 25 mg capsule Take 1 capsule (25 mg) by mouth as needed at bedtime for sleep. 90 capsule 0     docusate sodium (Colace) 100 mg capsule Take 1 capsule (100 mg) by mouth 2 times a day as needed for constipation. 90 capsule 2     doxylamine (Unisom) 25 mg tablet Take 1 tablet (25 mg) by mouth as needed at bedtime for sleep. (Patient not taking: Reported on  8/5/2025) 60 tablet 0     ferrous sulfate 325 (65 Fe) mg tablet Take 1 tablet (325 mg) by mouth once daily with breakfast. 90 tablet 3     prenatal vitamin, iron-folic, 27 mg iron-800 mcg folic acid tablet Take 1 tablet by mouth once daily. 90 tablet 3

## 2025-08-06 NOTE — ANESTHESIA PROCEDURE NOTES
CSE Block    Patient location during procedure: OR  Start time: 8/6/2025 1:15 AM  End time: 8/6/2025 1:38 AM  Reason for block: primary anesthetic}  Staffing  Performed: resident   Authorized by: Eliazar Camarena DO    Performed by: Breana Rios MD    Preanesthetic Checklist  Completed: patient identified, IV checked, risks and benefits discussed, surgical consent, monitors and equipment checked, pre-op evaluation, timeout performed and sterile techniques followed  Block Timeout  RN/Licensed healthcare professional reads aloud to the Anesthesia provider and entire team: Patient identity, procedure with side and site, patient position, and as applicable the availability of implants/special equipment/special requirements.  Patient on coagulant treatment: no  Timeout performed at: 8/6/2025 1:15 AM    CSE Block  Patient position: sitting  Prep: ChloraPrep  Sterility prep: mask, hand, gloves, drape and cap  Sedation level: no sedation  Patient monitoring: heart rate, continuous pulse oximetry and blood pressure  Approach: midline  Local numbing: lidocaine 1% to skin and subcutaneous tissues  Vertebral space: lumbar  L3-4  Guidance: landmark technique    Epidural Needle  SHASHANK technique: saline  Needle type: Tuohy   Needle gauge: 17 G  Needle length: 8.9 cm  Needle insertion depth: 5.5 cm  Spinal Needle  Needle type: pencil-point   Needle gauge: 25 G  Needle length: 12.7 cm  Free flow CSF: yes  Epidural Catheter  Catheter type: multi-orifice  Catheter size: 19 G  Catheter at skin depth: 10.5 cm  Catheter securement method: clear occlusive dressing and liquid medical adhesive              Assessment  Sensory level: T4 bilateral  Block outcome: block to be assessed in the OR  Number of attempts: 1  Procedure assessment: patient tolerated procedure well with no immediate complications

## 2025-08-06 NOTE — CARE PLAN
The patient's goals for the shift include rest, visit baby    The clinical goals for the shift include PP milestones, rest, lochia WDL, VSS      Problem: Antepartum  Goal: Maintain pregnancy as long as maternal and/or fetal condition is stable  Outcome: Met  Goal: Avoid/minimize constipation  Outcome: Met  Goal: No decrease in circulation/VTE  Outcome: Met  Goal: FHR remains reassuring  Outcome: Met  Goal: Minimize anxiety/maximize coping  Outcome: Met     Problem: Vaginal Birth or  Section  Goal: Fetal and maternal status remain reassuring during the birth process  Outcome: Met  Goal: Tolerate CRB for IOL placement maintenance until dislodgement/removal 12hrs after placement  Outcome: Met  Goal: Prevention of malpresentation/labor dystocia through delivery  Outcome: Met  Goal: Demonstrates labor coping techniques through delivery  Outcome: Met  Goal: Minimal s/sx of HDP and BP<160/110  Outcome: Met  Goal: No s/sx of infection through recovery  Outcome: Met  Goal: No s/sx of hemorrhage through recovery  Outcome: Met     Problem: Postpartum  Goal: Experiences normal postpartum course  Outcome: Progressing  Goal: Appropriate maternal -  bonding  Outcome: Progressing  Goal: Establish and maintain infant feeding pattern for adequate nutrition  Outcome: Progressing  Goal: Incisions, wounds, or drain sites healing without S/S of infection  Outcome: Progressing  Goal: No s/sx infection  Outcome: Progressing  Goal: No s/sx of hemorrhage  Outcome: Progressing  Goal: Minimal s/sx of HDP and BP<160/110  Outcome: Progressing     Problem:  Recovery Care  Goal: Verbalizes understanding of post-op instructions  Outcome: Met  Goal: Manages discomfort  Outcome: Met  Goal: Dressing intact until removed with any drainage marked  Outcome: Met  Goal: Patient vital signs are stable  Outcome: Met  Goal: Urine output is 0.5 mL/kg/hr or more  Outcome: Met  Goal: Fundus firm at midline  Outcome: Met     Problem:  Anemia in pregnancy  Goal: Tolerates treatment for anemia  Outcome: Progressing     Problem: Pain - Adult  Goal: Verbalizes/displays adequate comfort level or baseline comfort level  Outcome: Progressing     Problem: Safety - Adult  Goal: Free from fall injury  Outcome: Progressing     Problem: Discharge Planning  Goal: Discharge to home or other facility with appropriate resources  Outcome: Progressing     Problem: Chronic Conditions and Co-morbidities  Goal: Patient's chronic conditions and co-morbidity symptoms are monitored and maintained or improved  Outcome: Progressing     Problem: Skin  Goal: Decreased wound size/increased tissue granulation at next dressing change  Outcome: Progressing  Goal: Participates in plan/prevention/treatment measures  Outcome: Progressing  Goal: Prevent/manage excess moisture  Outcome: Progressing  Goal: Prevent/minimize sheer/friction injuries  Outcome: Progressing  Goal: Promote/optimize nutrition  Outcome: Progressing  Goal: Promote skin healing  Outcome: Progressing

## 2025-08-06 NOTE — ANESTHESIA PREPROCEDURE EVALUATION
Patient: Fatou Zambrano    Evaluation Method: In-person visit    Procedure Information       Date: 25    Procedure:  DELIVERY    Location: [facility] OB    Surgeons: Jolie Martin MD            Relevant Problems   Neuro   (+) Anxiety      GYN   (+) 36 weeks gestation of pregnancy ([facility])       Clinical information reviewed:   Tobacco  Allergies  Meds   Med Hx  Surg Hx   Fam Hx          NPO Detail:  No data recorded     OB/Gyn Evaluation    Present Pregnancy    Patient is pregnant now.  (+) , previous  section   Obstetric History                Physical Exam    Airway  Mallampati: II  TM distance: >3 FB  Mouth opening: 3 or more finger widths     Cardiovascular   Rhythm: regular  Rate: normal     Dental - normal exam     Pulmonary Breath sounds clear to auscultation     Abdominal            Anesthesia Plan    History of general anesthesia?: yes  History of complications of general anesthesia?: no    ASA 3     CSE     Postoperative pain plan includes opioids.  Anesthetic plan and risks discussed with patient.  Use of blood products discussed with patient who consented to blood products.    Plan discussed with resident and attending.

## 2025-08-06 NOTE — L&D DELIVERY NOTE
Birth Operative Report    Patient Name: Fatou Zambrano  : 2000  MRN: [id number]  Age: 25 y.o.    /Para:   Gestational Age: 36w6d    Date of Surgery: 2025    Operating Room Location: * No operating room entered *    Pre-op Diagnosis:  Intrauterine Pregnancy at 36w6d  NRFHT   contractions    Post-op Diagnosis:  Same    Procedure:   Repeat Low Transverse  Section    Surgery Category at [facility] Time: Confirmed Non-scheduled, Non-urgent    Surgeon:    * Jolie Martin - Primary    Resident/Fellow/Other Assistant:   Surgeons and Role:  Dasha Stewart MD PGY-4    Anesthesia:  Type: CSE     Anesthesiologist: Eliazar Camarena DO  Anesthesia Resident: Breana Rios MD    Surgical Staff:  Circulator: Abdulaziz Duong RN  Scrub Person: Jolie Mccabe     Preoperative Antibiotics: Ancef 2 g    Indication for Procedure:   25 y.o.  at 36w6d who presented to triage due to  contractions with history of 2 prior c-sections. NST notable for recurrent late decels, therefore delivery recommended given non reassuring fetal tracing.    Informed Consent:  The risks, benefits, complications, and alternatives were discussed with the patient. The patient understood that the risks of  section include but are not limited to infection, bleeding, injury to nearby structures or organs, possible need for transfusion, and potential need for more surgery. The patient stated understanding and desired to proceed. All questions were answered. The site of surgery was properly noted and marked. The patient's identity was confirmed, and the procedure verified as a  delivery. A Time Out was held and the above information confirmed.     Findings:   Gravid uterus with thin lower uterine segment, fallopian tubes, and ovaries. Amniotic fluid Meconium, Male infant in Transverse Occiput Transverse presentation, APGARS 7 , 8 .  Birth Weight 2.59 kg.    Description of  Procedure:   Patient was taken to the OR where regional anesthesia was found to be adequate.  She was then placed in the dorsal supine position with a left lateral tilt. A little catheter was placed, SCDs were applied, and a vaginal prep was performed. A pre-procedure time out was performed. The patient was given a prophylactic dose of IV antibiotics.  She was then prepped and draped in the usual sterile fashion.     A Pfannenstiel skin incision was made with the scalpel through the skin and subcutaneous fat to the underlying fascial layer. The fascia was incised in the midline with the scalpel and the incision was extended bilaterally. The superior aspect of the incision was grasped, tented up with Kocher clamps and the rectus muscle was dissected off. The muscles were divided in the midline, the peritoneum was then identified and entered bluntly and incision extended superiorly and inferiorly taking care to avoid underlying viscera.  The bladder blade was inserted.       Uterine Incision was made with the scalpel, the uterine cavity was entered, and the hysterotomy was extended cephalocaudally by stretching. The infant was delivered atraumatically, the cord was clamped and cut and infant was handed off to awaiting nursing.  A cord segment and blood sample were collected. The placenta was then expressed. The uterus was cleared of all clot and debris. The uterine incision was repaired using a running locked stitch of 0-vicryl in one layer. Adequate hemostasis was noted.    The distal portion of the left fallopian tube was grasped using Rogers and the ligasure device was used to separate the fallopian tube from the mesosalpinx along the full length of the fallopian tube and finally transected at the level of the cornua. This was repeated on the opposite side to remove the right fallopian tube.    The fascia was then reapproximated with a running stitch of 0 PDS.  The subcutaneous tissue was reapproximated with 2-0  monocryl. The skin edges were then reapproximated with a running subcuticular stitch 4-0 Vicryl. The patient tolerated the procedure well without complications.  All counts were correct.  The patient was taken back to the recovery room in stable condition. Dr. Martin was present for all key portions of the procedure.         * Jolie Martin - Primary was present for key portions of the procedure.    Additional Procedures:  Tubal Sterilization  Attention was then turned to the tubal sterilization portion of the procedure.  The right fallopian tube was grasped with a Agus and followed to the fimbriated end.  The complete Fallopian tube was then ligated and excised using the Ligasure device. Adequate hemostasis was noted.  The same procedure was performed on the left side.    Task performed by: N/A    Complications:      Quantitative Blood Loss:   Delivery Blood Loss: 452 mL (2025  1:59 AM - 2025  6:04 AM)         Anesthesia Record               Intraprocedure I/O Totals          Intake    LR bolus 1800.00 mL    Oxytocin Drip 359.00 mL    The total shown is the total volume documented since Anesthesia Start was filed.    Phenylephrine Drip 48.00 mL    The total shown is the total volume documented since Anesthesia Start was filed.    Total Intake 2207 mL       Output    Urine 130 mL    Total Blood Loss - Surgical Delivery (mL) 452 mL    Total Output 582 mL       Net    Net Volume 1625 mL            Blood products:  none   Blood Product Administration History       None            Uterotonics/Hemostatic Agent: IV Pitocin 30 units    Specimen:   Placenta  Delivered: 2025  2:07 AM  Appearance: Intact  Removal: Expressed    Disposition: pathology    Sponge/Instrument/Needle Counts: The sponge, lap and needle counts were correct.    Patient Disposition: Patient recovering on labor and delivery in stable condition.    Jes Zambrano [[id number]]      Labor Events     labor?: Yes  Sac  identifier: Sac 1  Rupture date/time: 2025 0206  Rupture type: Artificial  Fluid color: Meconium  Fluid odor: None  Labor type: Spontaneous Onset of Labor  Labor allowed to proceed with plans for an attempted vaginal birth?: Yes  Augmentation: None  Complications: Fetal Intolerance       Labor Event Times    Labor onset date/time: 2025 1900       Placenta    Placenta delivery date/time: 2025 02:07  Placenta removal: Expressed  Placenta appearance: Intact  Placenta disposition: pathology       Cord    Vessels: 3 vessels  Delayed cord clamping?: Yes  Cord clamped date/time: 2025 02:06:00  Cord blood disposition: Lab  Gases sent?: No  Stem cell collection (by provider): No       Lacerations    Episiotomy: None  Perineal laceration: None  Other lacerations?: No  Repair suture: None       Anesthesia    Method: Combined spinal-epidural       Operative Delivery    Forceps attempted?: No  Vacuum extractor attempted?: No       Shoulder Dystocia    Shoulder dystocia present?: No       [facility] Delivery    Time head delivered: 2025 03:23:57  Birth date/time: 2025 02:06:00  Delivery type: , Low Transverse   categorization: repeat   priority: non-scheduled, non-urgent  Indications for : Persistent Category 2 Tracing Remote from Delivery  Complications: Fetal Intolerance       Resuscitation    Method: Suctioning, Supplemental oxygen, Continuous positive airway pressure (CPAP), Tactile stimulation       Apgars    Living status: Living  Apgar Component Scores:  1 min.:  5 min.:  10 min.:  15 min.:  20 min.:    Skin color:  1  1       Heart rate:  2  2       Reflex irritability:  1  1       Muscle tone:  1  2       Respiratory effort:  2  2       Total:  7  8       Apgars assigned by: PRAVIN KELLER       Delivery Providers    Delivering clinician: Jolie Martin MD   Provider Role    Abdulaziz Duong, RN Delivery Nurse    Riri Gutierrez RN Nursery Nurse    Dasha Stewart MD  Resident

## 2025-08-07 LAB
ERYTHROCYTE [DISTWIDTH] IN BLOOD BY AUTOMATED COUNT: 14.6 % (ref 11.5–14.5)
HCT VFR BLD AUTO: 31.5 % (ref 36–46)
HGB BLD-MCNC: 10.1 G/DL (ref 12–16)
MCH RBC QN AUTO: 28.5 PG (ref 26–34)
MCHC RBC AUTO-ENTMCNC: 32.1 G/DL (ref 32–36)
MCV RBC AUTO: 89 FL (ref 80–100)
NRBC BLD-RTO: 0 /100 WBCS (ref 0–0)
PLATELET # BLD AUTO: 197 X10*3/UL (ref 150–450)
RBC # BLD AUTO: 3.54 X10*6/UL (ref 4–5.2)
WBC # BLD AUTO: 9.8 X10*3/UL (ref 4.4–11.3)

## 2025-08-07 PROCEDURE — 85027 COMPLETE CBC AUTOMATED: CPT

## 2025-08-07 PROCEDURE — 2500000004 HC RX 250 GENERAL PHARMACY W/ HCPCS (ALT 636 FOR OP/ED): Mod: SE

## 2025-08-07 PROCEDURE — 1210000001 HC SEMI-PRIVATE ROOM DAILY

## 2025-08-07 PROCEDURE — 36415 COLL VENOUS BLD VENIPUNCTURE: CPT

## 2025-08-07 PROCEDURE — 2500000001 HC RX 250 WO HCPCS SELF ADMINISTERED DRUGS (ALT 637 FOR MEDICARE OP): Mod: SE

## 2025-08-07 RX ADMIN — IBUPROFEN 600 MG: 600 TABLET ORAL at 04:30

## 2025-08-07 RX ADMIN — IBUPROFEN 600 MG: 600 TABLET ORAL at 11:39

## 2025-08-07 RX ADMIN — ACETAMINOPHEN 975 MG: 325 TABLET ORAL at 04:30

## 2025-08-07 RX ADMIN — ACETAMINOPHEN 975 MG: 325 TABLET ORAL at 23:45

## 2025-08-07 RX ADMIN — OXYCODONE HYDROCHLORIDE 10 MG: 10 TABLET ORAL at 13:05

## 2025-08-07 RX ADMIN — IBUPROFEN 600 MG: 600 TABLET ORAL at 23:45

## 2025-08-07 RX ADMIN — ACETAMINOPHEN 975 MG: 325 TABLET ORAL at 11:39

## 2025-08-07 RX ADMIN — IBUPROFEN 600 MG: 600 TABLET ORAL at 17:28

## 2025-08-07 RX ADMIN — ACETAMINOPHEN 975 MG: 325 TABLET ORAL at 17:28

## 2025-08-07 RX ADMIN — ENOXAPARIN SODIUM 40 MG: 100 INJECTION SUBCUTANEOUS at 15:05

## 2025-08-07 ASSESSMENT — PAIN DESCRIPTION - LOCATION: LOCATION: INCISION

## 2025-08-07 ASSESSMENT — PAIN SCALES - GENERAL
PAINLEVEL_OUTOF10: 10 - WORST POSSIBLE PAIN
PAINLEVEL_OUTOF10: 7
PAINLEVEL_OUTOF10: 3
PAINLEVEL_OUTOF10: 1
PAINLEVEL_OUTOF10: 10 - WORST POSSIBLE PAIN
PAINLEVEL_OUTOF10: 2

## 2025-08-07 ASSESSMENT — PAIN - FUNCTIONAL ASSESSMENT
PAIN_FUNCTIONAL_ASSESSMENT: 0-10
PAIN_FUNCTIONAL_ASSESSMENT: 0-10

## 2025-08-07 NOTE — ANESTHESIA POSTPROCEDURE EVALUATION
Patient: Fatou Zambrano    Procedure Summary       Date: 25 Room / Location: [facility] OB    Anesthesia Start: 106 Anesthesia Stop: 318    Procedure:  DELIVERY Diagnosis:       Previous  section      (Previous  section [Z98.891])    Surgeons: Jolie Martin MD Responsible Provider: Eliazar Camarena DO    Anesthesia Type: CSE ASA Status: 3          Fatou Zambrano is a 25 y.o., , who had a , Low Transverse delivery on 2025 at 36w6d and is now POD1.    She had Neuraxial Anesthesia without immediate complications noted.       Pain well controlled    Vitals:    25 0433   BP: 97/63   Pulse: 76   Resp: 16   Temp: 36.6 °C (97.9 °F)   SpO2: 100%       Neuraxial site assessed. No visible redness or swelling or drainage. Patient able to ambulate and move all extremities without difficulty. Able to void. No complaints of nausea/vomiting. Tolerating PO intake well. No s/sx of PDPH.     Anesthesia will sign off     MARIAA MAKI      Anesthesia Post Evaluation    Patient location during evaluation: bedside  Patient participation: complete - patient participated  Level of consciousness: awake and alert  Pain management: adequate  Airway patency: patent  Cardiovascular status: acceptable  Respiratory status: acceptable  Hydration status: acceptable  Postoperative Nausea and Vomiting: none        There were no known notable events for this encounter.

## 2025-08-07 NOTE — PROGRESS NOTES
Postpartum Progress Note    Assessment/Plan   Fatou Zambrano is a 25 y.o., , who delivered at 36w6d gestation    Now PPD#1 s/p , Low Transverse on 2025  - Continue routine postpartum care  - Pain well controlled on po medications  - DVT risk score DVT Score (IF A SCORE IS NOT CALCULATING, MUST SELECT A BMI TO COMPLETE): 7 , ppx with SCDs, ambulation, and lovenox  - RH positive, rhogam not indicated  - Hgb:   Results from last 7 days   Lab Units 25  0735 25  1840   HEMOGLOBIN g/dL 10.1* 10.7*        Pregnancy notables:   - Hx of CS x2, 1st for NRFHT  - Hx of congenital heart defect in 2nd child  - Anxiety   - Anticipated mode of delivery: rLTCS  - Sickle cell trait    Maternal Well-Being  - Vitals stable  - All questions and concerns address    [place] Feeding  - Breastfeeding/pumping encouraged  - Lactation consult prn    Contraception  - tubal ligation  - Education provided    Dispo  - Anticipate d/c on PPD #3 if meeting all postpartum milestones  - Follow-up in 1-2wks for incision check  - Follow-up in 4-6wks with primary GARRETTYN    YAW Enriquez-CNP     Assessment & Plan  Labor and delivery indication for care or intervention ([facility])    Previous  section    Pregnancy Problems (from 24 to present)       Problem Noted Diagnosed Resolved    Labor and delivery indication for care or intervention ([facility]) 2025 by Leyda Barillas MD  No    Priority:  Medium       Pain of round ligament affecting pregnancy, antepartum ([facility]) 2025 by JAYLYN Hills  No    Priority:  Medium       Anxiety 6/3/2025 by JAYLYN Hills  No    Priority:  Medium       Back pain affecting pregnancy 2025 by JAYLYN Fajardo  No    Priority:  Medium       Nausea and vomiting during pregnancy prior to 22 weeks gestation 2025 by JAYLYN Hills  No    Priority:  Medium       Sickle cell trait 1/15/2025 by  JAYLYN Hills  No    Priority:  Medium       36 weeks gestation of pregnancy ([facility]) 2024 by JAYLNY Hills  No    Priority:  Medium       Overview Addendum 2025 11:59 AM by JAYLYN Hills   Desired provider in labor: [] CNM  [] Physician  [x] Blood Products: [x] Yes, accepts [] No, needs counseling  [x] Initial BMI: 26.20  [x] Prenatal Labs:   [x] Cervical Cancer Screening up to date due PP  [x] Rh status: Rh+  [x] Genetic Screening:   RR cf DNA male  [x] NT US: (11-13 wks)  [x] Baby ASA (if indicated):  [x] Pregnancy dated by: LMP    [x] Anatomy US: (19-20 wks) WNL  [x] Federal Sterilization consent signed (if indicated): 2025  [x] 1hr GCT at 24-28wks: 72  [] Rhogam (if indicated): Not applicable  [] Fetal Surveillance (if indicated):  [x] Tdap (27-32 wks, may be given up to 36 wks if initial window missed): declined 2025    [] Breastfeeding:  [x] Postpartum Birth control method: PPTL  [x] GBS at 36 - 37 wks: collected 2025  [] 39 weeks discussion of IOL vs. 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  No acute events overnight.  Voiding spontaneously, passing flatus.  Pain well controlled on PO meds.  Light lochia. Tolerating diet.  Denies dizziness, lightheadedness, LOC, or uncontrolled bleeding.    Objective   Allergies:   Amoxicillin, Other, Penicillin, Tomato, and Penicillins         Last Vitals:  Temp Pulse Resp BP MAP Pulse Ox   36.2 °C (97.2 °F) 82 16 114/77   100 %     Vitals Min/Max Last 24 Hours:  Temp  Min: 36.2 °C (97.2 °F)  Max: 36.6 °C (97.9 °F)  Pulse  Min: 68  Max:  87  Resp  Min: 16  Max: 18  BP  Min: 91/52  Max: 114/77    Intake/Output:     Intake/Output Summary (Last 24 hours) at 8/7/2025 1125  Last data filed at 8/7/2025 0652  Gross per 24 hour   Intake --   Output 2550 ml   Net -2550 ml       Physical Exam:  General: examination reveals a well developed, well nourished, female, in no acute distress. She is alert and cooperative.  HEENT: external ears normal. Nose normal, no erythema or discharge.  Neck: supple, no significant adenopathy  Lungs: breathing even and unlabored, lungs clear  Cardiac: warm and well perfused, heart rate regular  Fundus: firm and below umbilicus, lochia light  Abdominal: soft, non-tender, non-distended, bowel sounds active  Extremities: no redness or tenderness in the calves or thighs, edema: not present  Neurological: alert, oriented, normal speech, no focal findings or movement disorder noted.  Psychological: awake and alert; oriented to person, place, and time.  Skin: incision clean, dry, intact, well approximated, no redness, drainage, or warmth     Lab Data:  Lab Results   Component Value Date    WBC 9.8 08/07/2025    HGB 10.1 (L) 08/07/2025    HCT 31.5 (L) 08/07/2025     08/07/2025

## 2025-08-08 ENCOUNTER — PHARMACY VISIT (OUTPATIENT)
Dept: PHARMACY | Facility: CLINIC | Age: 25
End: 2025-08-08
Payer: MEDICAID

## 2025-08-08 LAB
BLOOD EXPIRATION DATE: NORMAL
DISPENSE STATUS: NORMAL
PRODUCT BLOOD TYPE: 5100
PRODUCT CODE: NORMAL
UNIT ABO: NORMAL
UNIT NUMBER: NORMAL
UNIT RH: NORMAL
UNIT VOLUME: 350
XM INTEP: NORMAL

## 2025-08-08 PROCEDURE — 2500000004 HC RX 250 GENERAL PHARMACY W/ HCPCS (ALT 636 FOR OP/ED): Mod: SE

## 2025-08-08 PROCEDURE — 1210000001 HC SEMI-PRIVATE ROOM DAILY

## 2025-08-08 PROCEDURE — 2500000001 HC RX 250 WO HCPCS SELF ADMINISTERED DRUGS (ALT 637 FOR MEDICARE OP): Mod: SE

## 2025-08-08 PROCEDURE — RXMED WILLOW AMBULATORY MEDICATION CHARGE

## 2025-08-08 RX ORDER — IBUPROFEN 600 MG/1
600 TABLET, FILM COATED ORAL EVERY 6 HOURS
Qty: 30 TABLET | Refills: 0 | Status: SHIPPED | OUTPATIENT
Start: 2025-08-08

## 2025-08-08 RX ORDER — ACETAMINOPHEN 325 MG/1
975 TABLET ORAL EVERY 6 HOURS
Qty: 90 TABLET | Refills: 0 | Status: SHIPPED | OUTPATIENT
Start: 2025-08-08

## 2025-08-08 RX ORDER — NALOXONE HYDROCHLORIDE 4 MG/.1ML
4 SPRAY NASAL AS NEEDED
Qty: 2 EACH | Refills: 11 | Status: SHIPPED | OUTPATIENT
Start: 2025-08-08

## 2025-08-08 RX ORDER — OXYCODONE HYDROCHLORIDE 5 MG/1
5 TABLET ORAL EVERY 4 HOURS PRN
Qty: 10 TABLET | Refills: 0 | Status: SHIPPED | OUTPATIENT
Start: 2025-08-08

## 2025-08-08 RX ADMIN — IBUPROFEN 600 MG: 600 TABLET ORAL at 05:36

## 2025-08-08 RX ADMIN — ACETAMINOPHEN 975 MG: 325 TABLET ORAL at 11:39

## 2025-08-08 RX ADMIN — OXYCODONE HYDROCHLORIDE 10 MG: 10 TABLET ORAL at 00:28

## 2025-08-08 RX ADMIN — ENOXAPARIN SODIUM 40 MG: 100 INJECTION SUBCUTANEOUS at 18:21

## 2025-08-08 RX ADMIN — ACETAMINOPHEN 975 MG: 325 TABLET ORAL at 18:17

## 2025-08-08 RX ADMIN — OXYCODONE HYDROCHLORIDE 10 MG: 10 TABLET ORAL at 19:26

## 2025-08-08 RX ADMIN — OXYCODONE HYDROCHLORIDE 5 MG: 5 TABLET ORAL at 12:43

## 2025-08-08 RX ADMIN — IBUPROFEN 600 MG: 600 TABLET ORAL at 18:17

## 2025-08-08 RX ADMIN — IBUPROFEN 600 MG: 600 TABLET ORAL at 11:39

## 2025-08-08 RX ADMIN — ACETAMINOPHEN 975 MG: 325 TABLET ORAL at 05:36

## 2025-08-08 ASSESSMENT — PAIN SCALES - GENERAL
PAINLEVEL_OUTOF10: 4
PAINLEVEL_OUTOF10: 0 - NO PAIN
PAINLEVEL_OUTOF10: 10 - WORST POSSIBLE PAIN
PAINLEVEL_OUTOF10: 0 - NO PAIN

## 2025-08-08 ASSESSMENT — PAIN - FUNCTIONAL ASSESSMENT
PAIN_FUNCTIONAL_ASSESSMENT: 0-10
PAIN_FUNCTIONAL_ASSESSMENT: 0-10

## 2025-08-08 NOTE — PROGRESS NOTES
Postpartum Progress Note    Assessment/Plan   Fatou Zambrano is a 25 y.o., , who delivered at 36w6d gestation    Now PPD#2 s/p , Low Transverse on 2025  - Continue routine postpartum care  - Pain well controlled on po medications  - DVT risk score DVT Score (IF A SCORE IS NOT CALCULATING, MUST SELECT A BMI TO COMPLETE): 7 , ppx with SCDs, ambulation, and lovenox  - RH positive, rhogam not indicated  - Hgb:   Results from last 7 days   Lab Units 25  0735 25  1840   HEMOGLOBIN g/dL 10.1* 10.7*        Pregnancy notables:   - Hx of CS x2, 1st for NRFHT  - Hx of congenital heart defect in 2nd child  - Anxiety   - Anticipated mode of delivery: rLTCS  - Sickle cell trait    Maternal Well-Being  - Vitals stable  - All questions and concerns address    [place] Feeding  - Breastfeeding/pumping encouraged  - Lactation consult prn    Contraception  - tubal ligation  - Education provided    Dispo  - Anticipate d/c on PPD #3 if meeting all postpartum milestones  - Follow-up in 1-2wks for incision check  - Follow-up in 4-6wks with primary GARRETTYN    YAW Enriquez-CNP     Assessment & Plan  Labor and delivery indication for care or intervention ([facility])    Previous  section    Pregnancy Problems (from 24 to present)       Problem Noted Diagnosed Resolved    Labor and delivery indication for care or intervention ([facility]) 2025 by Leyda Barillas MD  No    Priority:  Medium       Pain of round ligament affecting pregnancy, antepartum ([facility]) 2025 by JAYLYN Hills  No    Priority:  Medium       Anxiety 6/3/2025 by JAYLYN Hills  No    Priority:  Medium       Back pain affecting pregnancy 2025 by JAYLYN Fajardo  No    Priority:  Medium       Nausea and vomiting during pregnancy prior to 22 weeks gestation 2025 by JAYLYN Hills  No    Priority:  Medium       Sickle cell trait 1/15/2025 by  JAYLYN Hills  No    Priority:  Medium       36 weeks gestation of pregnancy ([facility]) 2024 by JAYLYN Hills  No    Priority:  Medium       Overview Addendum 2025 11:59 AM by JAYLYN Hills   Desired provider in labor: [] CNM  [] Physician  [x] Blood Products: [x] Yes, accepts [] No, needs counseling  [x] Initial BMI: 26.20  [x] Prenatal Labs:   [x] Cervical Cancer Screening up to date due PP  [x] Rh status: Rh+  [x] Genetic Screening:   RR cf DNA male  [x] NT US: (11-13 wks)  [x] Baby ASA (if indicated):  [x] Pregnancy dated by: LMP    [x] Anatomy US: (19-20 wks) WNL  [x] Federal Sterilization consent signed (if indicated): 2025  [x] 1hr GCT at 24-28wks: 72  [] Rhogam (if indicated): Not applicable  [] Fetal Surveillance (if indicated):  [x] Tdap (27-32 wks, may be given up to 36 wks if initial window missed): declined 2025    [] Breastfeeding:  [x] Postpartum Birth control method: PPTL  [x] GBS at 36 - 37 wks: collected 2025  [] 39 weeks discussion of IOL vs. 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  No acute events overnight.  Voiding spontaneously, passing flatus.  Pain well controlled on PO meds.  Light lochia. Tolerating diet.  Denies dizziness, lightheadedness, LOC, or uncontrolled bleeding.    Objective   Allergies:   Amoxicillin, Other, Penicillin, Tomato, and Penicillins         Last Vitals:  Temp Pulse Resp BP MAP Pulse Ox   36.1 °C (97 °F) 72 16 101/66   97 %     Vitals Min/Max Last 24 Hours:  Temp  Min: 36.1 °C (97 °F)  Max: 36.8 °C (98.2 °F)  Pulse  Min: 72  Max: 82  Resp   Min: 16  Max: 20  BP  Min: 101/66  Max: 108/74    Intake/Output:   No intake or output data in the 24 hours ending 08/08/25 1014    Physical Exam:  General: examination reveals a well developed, well nourished, female, in no acute distress. She is alert and cooperative.  HEENT: external ears normal. Nose normal, no erythema or discharge.  Neck: supple, no significant adenopathy  Lungs: breathing even and unlabored, lungs clear  Cardiac: warm and well perfused, heart rate regular  Fundus: firm and below umbilicus, lochia light  Abdominal: soft, non-tender, non-distended, bowel sounds active  Extremities: no redness or tenderness in the calves or thighs, edema: non-pitting BLE  Neurological: alert, oriented, normal speech, no focal findings or movement disorder noted.  Psychological: awake and alert; oriented to person, place, and time.  Skin: incision clean, dry, intact, well approximated, no redness, drainage, or warmth     Lab Data:  Lab Results   Component Value Date    WBC 9.8 08/07/2025    HGB 10.1 (L) 08/07/2025    HCT 31.5 (L) 08/07/2025     08/07/2025

## 2025-08-08 NOTE — PROGRESS NOTES
Social Work Assessment     Patient: Fatou Zambrano, 26yo,   Referral Reason: assessment - anxiety/depression     Name: Landon Gordillo, MR# [id number]  [place] : 25    Other Children: 2 older children    FOB: Ms Zambrano identifies FOB (of all 3 children) as Gabriele Canchola. He was present and appropriate/supportive. She states they have been in a relationship since high school.     Household Composition: Ms Zambrano reports she live with FOB and their 3 children in a stable and appropriate home.     Supports: Ms Zambrano reports FOB and her mother are her supports. FOB present and mother caring for older children this admission.     IPV/DV or Safety Concerns: Ms Zambrano denies IPV/safety concerns at this time.     Car-Seat: yes  Safe Sleep Space: yes  Safe Sleep Education: reviewed    Mental Health Diagnoses/Concerns: Ms Zambrano with a history of depression/postpartum depression/anxiety. She confirms, states she felt isolated and had some SI (no plan) after the birth of her first child. She was on medication for a brief time and denies issues since. She states she had good support and could talk to her mother or FOB if she had symptoms and also reports they would notice if she was not coping well. SW reviewed postpartum depression signs, symptoms, and resources and Ms Zambrano indicated understanding.    Assessment:  SW met with Ms Zambrano for assessment. She was accepting and engaged. She reports she has needed items for  and good support. Safe sleep and postpartum education provided. No concerns noted.     Plan: Ms Zambrano and  clear from SW perspective.    Signature: AMADOR Garcia

## 2025-08-09 ENCOUNTER — PHARMACY VISIT (OUTPATIENT)
Dept: PHARMACY | Facility: CLINIC | Age: 25
End: 2025-08-09
Payer: MEDICAID

## 2025-08-09 VITALS
RESPIRATION RATE: 16 BRPM | TEMPERATURE: 97.9 F | DIASTOLIC BLOOD PRESSURE: 66 MMHG | HEART RATE: 77 BPM | HEIGHT: 64 IN | WEIGHT: 182.76 LBS | SYSTOLIC BLOOD PRESSURE: 104 MMHG | OXYGEN SATURATION: 97 % | BODY MASS INDEX: 31.2 KG/M2

## 2025-08-09 PROBLEM — B95.1 POSITIVE GBS TEST: Status: ACTIVE | Noted: 2025-08-09

## 2025-08-09 PROCEDURE — 99238 HOSP IP/OBS DSCHRG MGMT 30/<: CPT

## 2025-08-09 PROCEDURE — 2500000001 HC RX 250 WO HCPCS SELF ADMINISTERED DRUGS (ALT 637 FOR MEDICARE OP): Mod: SE

## 2025-08-09 PROCEDURE — RXMED WILLOW AMBULATORY MEDICATION CHARGE

## 2025-08-09 RX ORDER — POLYETHYLENE GLYCOL 3350 17 G/17G
17 POWDER, FOR SOLUTION ORAL DAILY
Qty: 510 G | Refills: 0 | Status: SHIPPED | OUTPATIENT
Start: 2025-08-09

## 2025-08-09 RX ADMIN — IBUPROFEN 600 MG: 600 TABLET ORAL at 06:45

## 2025-08-09 RX ADMIN — IBUPROFEN 600 MG: 600 TABLET ORAL at 12:22

## 2025-08-09 RX ADMIN — IBUPROFEN 600 MG: 600 TABLET ORAL at 00:20

## 2025-08-09 RX ADMIN — ACETAMINOPHEN 975 MG: 325 TABLET ORAL at 00:18

## 2025-08-09 RX ADMIN — OXYCODONE HYDROCHLORIDE 5 MG: 5 TABLET ORAL at 12:22

## 2025-08-09 RX ADMIN — ACETAMINOPHEN 975 MG: 325 TABLET ORAL at 06:44

## 2025-08-09 RX ADMIN — ACETAMINOPHEN 975 MG: 325 TABLET ORAL at 12:22

## 2025-08-09 ASSESSMENT — PAIN - FUNCTIONAL ASSESSMENT
PAIN_FUNCTIONAL_ASSESSMENT: 0-10
PAIN_FUNCTIONAL_ASSESSMENT: 0-10

## 2025-08-09 ASSESSMENT — PAIN SCALES - GENERAL
PAINLEVEL_OUTOF10: 7
PAINLEVEL_OUTOF10: 4
PAINLEVEL_OUTOF10: 0 - NO PAIN

## 2025-08-09 ASSESSMENT — PAIN DESCRIPTION - DESCRIPTORS
DESCRIPTORS: SORE
DESCRIPTORS: SORE

## 2025-08-09 ASSESSMENT — PAIN DESCRIPTION - LOCATION
LOCATION: INCISION
LOCATION: INCISION

## 2025-08-09 NOTE — DISCHARGE SUMMARY
Discharge Summary    Fatou Zambrano is a 25 y.o. year old female , who delivered at 36w6d gestation via , Low Transverse, now PPD#3.    Admission Date: 2025  Discharge Date: 25       Discharge Diagnosis  , Low Transverse    Hospital Course  Delivery Date: 2025 2:06 AM  Delivery type: , Low Transverse   GA at delivery: 36w6d   Outcome: Living  Intrapartum complications: Fetal Intolerance  Feeding method: Breastfeeding Status: No       Contraception: s/p BTL  We discussed pregnancy spacing of at least one year, abstaining from intercourse for 6wks, and the ability to become pregnant in the absence of regular menses. Pt verbalized understanding.    Pertinent Physical Exam At Time of Discharge    Physical Exam  Vitals reviewed.   Constitutional:       Appearance: Normal appearance.   HENT:      Head: Normocephalic.     Cardiovascular:      Rate and Rhythm: Normal rate and regular rhythm.      Pulses: Normal pulses.      Heart sounds: Normal heart sounds.   Pulmonary:      Effort: Pulmonary effort is normal.      Breath sounds: Normal breath sounds.   Abdominal:      General: Abdomen is flat. Bowel sounds are normal.      Palpations: Abdomen is soft.      Comments: Fundus firm, midline, below umbilicus, light lochia   C/S incision WNL--no erythema or drainage, edges intact, well-approximated        Skin:     General: Skin is warm.     Neurological:      Mental Status: She is alert.     Psychiatric:         Mood and Affect: Mood normal.         Behavior: Behavior normal.          Pertinent Subjective at Time of Discharge  Fatou Zambrano is a 25 y.o. year old female , who delivered at 36w6d gestation via , Low Transverse, now PPD#3, who reports feeling overall well.  No acute events overnight.  Voiding spontaneously, passing flatus.  Pain well controlled on PO meds.  Light lochia. Tolerating diet.  Denies HA, CP, SOB, RUQ pain, vision changes or N/V.  "Denies dizziness, lightheadedness, LOC, or uncontrolled bleeding.    Vitals  /66 (BP Location: Right arm, Patient Position: Sitting)   Pulse 77   Temp 36.6 °C (97.9 °F) (Temporal)   Resp 16   Ht 1.626 m (5' 4\")   Wt 82.9 kg (182 lb 12.2 oz)   LMP 11/21/2024   SpO2 97%   Breastfeeding No   BMI 31.37 kg/m²      Discharge Meds     Your medication list        START taking these medications        Instructions Last Dose Given Next Dose Due   ibuprofen 600 mg tablet      Take 1 tablet (600 mg) by mouth every 6 hours.       naloxone 4 mg/0.1 mL nasal spray  Commonly known as: Narcan      Administer 1 spray (4 mg) into affected nostril(s) if needed for opioid reversal or respiratory depression. May repeat every 2-3 minutes if needed, alternating nostrils, until medical assistance becomes available.       oxyCODONE 5 mg immediate release tablet  Commonly known as: Roxicodone      Take 1 tablet (5 mg) by mouth every 4 hours if needed (Pain score 6-8).       polyethylene glycol 17 gram/dose powder  Commonly known as: Glycolax, Miralax      Mix 17 g of powder as directed and drink once daily.              CHANGE how you take these medications        Instructions Last Dose Given Next Dose Due   acetaminophen 325 mg tablet  Commonly known as: Tylenol  What changed:   medication strength  how much to take  when to take this  reasons to take this      Take 3 tablets (975 mg) by mouth every 6 hours.              CONTINUE taking these medications        Instructions Last Dose Given Next Dose Due   ferrous sulfate 325 mg (65 mg elemental) tablet      Take 1 tablet (325 mg) by mouth once daily with breakfast.              STOP taking these medications      cholecalciferol 25 mcg (1,000 units) tablet  Commonly known as: Vitamin D-3        cyclobenzaprine 5 mg tablet  Commonly known as: Flexeril        diphenhydrAMINE 25 mg capsule  Commonly known as: BENADryl        docusate sodium 100 mg capsule  Commonly known as: " Colace        doxylamine 25 mg tablet  Commonly known as: Unisom        prenatal vitamin (iron-folic) 27 mg iron-800 mcg folic acid tablet                  Where to Get Your Medications        These medications were sent to [facility]  [address], [address], [facility] [address]      Hours: 8AM to 6PM Mon-Fri, 8AM to 4PM Sat, 9AM to 1PM Sun Phone: [phone]   polyethylene glycol 17 gram/dose powder       These medications [facility]  [address] [facility] [address]      Hours: 8:30 AM to 5 PM Mon-Fri Phone: [phone]   acetaminophen 325 mg tablet  ibuprofen 600 mg tablet  naloxone 4 mg/0.1 mL nasal spray  oxyCODONE 5 mg immediate release tablet          Complications Requiring Follow-Up  Anxiety  - No meds  - Mood stable  - Declines needs for additional resources at this time     Continues to meet all postpartum milestones.  Safe and stable for d/c home. Return precautions given.  Follow-up in 1-2wk for incision check and 4-6wk with [id number]     Test Results Pending At Discharge  Pending Labs       Order Current Status    Surgical Pathology Exam - FALLOPIAN TUBE SALPINGECTOMY LEFT In process    Surgical Pathology Exam - FALLOPIAN TUBE SALPINGECTOMY RIGHT In process    Surgical Pathology Exam - PLACENTA In process            Outpatient Follow-Up    I spent 20 minutes in the professional and overall care of this patient.      Alejandra Simons, APRN-CNP

## 2025-08-09 NOTE — CARE PLAN
The patient's goals for the shift include To have adequate pain control    The clinical goals for the shift include To bond well with     Patient met all criteria to be discharged home

## 2025-08-10 LAB — GP B STREP SPEC QL CULT: ABNORMAL

## 2025-08-12 ENCOUNTER — APPOINTMENT (OUTPATIENT)
Dept: OBSTETRICS AND GYNECOLOGY | Facility: HOSPITAL | Age: 25
End: 2025-08-12
Payer: COMMERCIAL

## 2025-08-18 DIAGNOSIS — Z98.891 PREVIOUS CESAREAN SECTION: ICD-10-CM

## 2025-08-19 ENCOUNTER — APPOINTMENT (OUTPATIENT)
Dept: OBSTETRICS AND GYNECOLOGY | Facility: HOSPITAL | Age: 25
End: 2025-08-19
Payer: COMMERCIAL

## 2025-08-21 LAB
LABORATORY COMMENT REPORT: NORMAL
PATH REPORT.FINAL DX SPEC: NORMAL
PATH REPORT.GROSS SPEC: NORMAL
PATH REPORT.RELEVANT HX SPEC: NORMAL
PATH REPORT.TOTAL CANCER: NORMAL

## 2025-09-01 ENCOUNTER — TELEPHONE (OUTPATIENT)
Dept: OBSTETRICS AND GYNECOLOGY | Facility: HOSPITAL | Age: 25
End: 2025-09-01
Payer: COMMERCIAL

## 2025-09-02 ENCOUNTER — APPOINTMENT (OUTPATIENT)
Dept: OBSTETRICS AND GYNECOLOGY | Facility: HOSPITAL | Age: 25
End: 2025-09-02
Payer: COMMERCIAL

## 2025-09-03 ENCOUNTER — CLINICAL SUPPORT (OUTPATIENT)
Dept: OBSTETRICS AND GYNECOLOGY | Facility: HOSPITAL | Age: 25
End: 2025-09-03
Payer: COMMERCIAL

## 2025-09-03 ENCOUNTER — APPOINTMENT (OUTPATIENT)
Dept: OBSTETRICS AND GYNECOLOGY | Facility: HOSPITAL | Age: 25
End: 2025-09-03
Payer: COMMERCIAL

## 2025-09-03 VITALS — SYSTOLIC BLOOD PRESSURE: 114 MMHG | WEIGHT: 167 LBS | DIASTOLIC BLOOD PRESSURE: 79 MMHG | BODY MASS INDEX: 28.67 KG/M2

## 2025-09-03 PROCEDURE — 99211 OFF/OP EST MAY X REQ PHY/QHP: CPT

## 2025-09-03 ASSESSMENT — PAIN SCALES - GENERAL: PAINLEVEL_OUTOF10: 0-NO PAIN

## 2025-09-09 ENCOUNTER — APPOINTMENT (OUTPATIENT)
Dept: OBSTETRICS AND GYNECOLOGY | Facility: HOSPITAL | Age: 25
End: 2025-09-09
Payer: COMMERCIAL

## 2025-09-29 ENCOUNTER — APPOINTMENT (OUTPATIENT)
Dept: OBSTETRICS AND GYNECOLOGY | Facility: HOSPITAL | Age: 25
End: 2025-09-29
Payer: COMMERCIAL

## (undated) DEVICE — SUTURE, MONOCRYL, 2-0, 36 IN, CTX, VIOLET

## (undated) DEVICE — TOWEL PACK, STERILE, 4/PACK, BLUE

## (undated) DEVICE — SUTURE, PDS II, 0 36 IN, CT-1, VIOLET

## (undated) DEVICE — DRAPE PACK, CESAREAN SECTION, CUSTOM, UHC

## (undated) DEVICE — SUTURE, MONOCRYL PLUS, 4-0, PS-2 UD 27IN